# Patient Record
Sex: MALE | Race: WHITE | NOT HISPANIC OR LATINO | Employment: FULL TIME | URBAN - METROPOLITAN AREA
[De-identification: names, ages, dates, MRNs, and addresses within clinical notes are randomized per-mention and may not be internally consistent; named-entity substitution may affect disease eponyms.]

---

## 2021-02-23 ENCOUNTER — OFFICE VISIT (OUTPATIENT)
Dept: INTERNAL MEDICINE | Facility: CLINIC | Age: 41
End: 2021-02-23

## 2021-02-23 VITALS
DIASTOLIC BLOOD PRESSURE: 72 MMHG | HEIGHT: 75 IN | HEART RATE: 63 BPM | SYSTOLIC BLOOD PRESSURE: 114 MMHG | TEMPERATURE: 97.3 F | WEIGHT: 245.3 LBS | BODY MASS INDEX: 30.5 KG/M2 | OXYGEN SATURATION: 99 %

## 2021-02-23 DIAGNOSIS — H93.11 RIGHT-SIDED TINNITUS: Primary | ICD-10-CM

## 2021-02-23 DIAGNOSIS — K21.00 GASTROESOPHAGEAL REFLUX DISEASE WITH ESOPHAGITIS WITHOUT HEMORRHAGE: ICD-10-CM

## 2021-02-23 PROBLEM — E78.5 HYPERLIPIDEMIA: Status: ACTIVE | Noted: 2021-02-23

## 2021-02-23 PROCEDURE — 99204 OFFICE O/P NEW MOD 45 MIN: CPT | Performed by: FAMILY MEDICINE

## 2021-02-23 RX ORDER — PANTOPRAZOLE SODIUM 40 MG/1
40 TABLET, DELAYED RELEASE ORAL DAILY
Qty: 60 TABLET | Refills: 1 | Status: SHIPPED | OUTPATIENT
Start: 2021-02-23 | End: 2021-06-01

## 2021-02-23 NOTE — PROGRESS NOTES
"Date of Encounter: 2021  Patient: Chi Simpson,  1980    Subjective   History of Presenting Illness  Chief complaint: Tinnitus    3 to 4-month history of constant tinnitus in his right ear.  Pulsatile at night but not during the day.  Not associated with any hearing loss.  No other associated symptoms including headache, nasal congestion, or ear pain.  History of multiple myringotomies, ear infections, and eardrum perforations in child and young adulthood.  No known recent drug exposure in particular aminoglycosides.  He does work in law enforcement and operates firearms regularly, although he wears appropriate protection.  He also had frequent exposure to  aircraft in the past.    Heartburn for the past 6 months, worsening with fatty foods like pizza.  Takes Tums 3-4 times per week. Not associate with emesis.  Not taking any other over-the-counter medications.    Lives with spouse, 2 children.  Prior very brief social smoker.  2-3 alcoholic drinks per week.  Exercises 4-5 times per week.  Diet is \"I work out to eat\".  Works in law enforcement.  Does not have a living will.  Second Covid vaccine today.    Review of Systems:  Negative for fever, congestion, chest pain upon exertion, shortness of breath, vision changes, vomiting, dysuria, lymphadenopathy, muscle weakness, numbness, mood changes, rashes.    The following portions of the patient's history were reviewed and updated as appropriate: allergies, current medications, past family history, past medical history, past social history, past surgical history and problem list.    Patient Active Problem List   Diagnosis   • Gastroesophageal reflux disease with esophagitis without hemorrhage   • Right-sided tinnitus     Past Medical History:   Diagnosis Date   • Torn meniscus      Past Surgical History:   Procedure Laterality Date   • KNEE ARTHROSCOPY Right 2011   • KNEE ARTHROSCOPY Left 1997     Family History   Problem Relation Age of " "Onset   • Leukemia Mother    • Heart disease Father    • Hypertension Father    • Prostate cancer Father    • Rheum arthritis Father    • Depression Father    • Alcohol abuse Father        Current Outpatient Medications:   •  pantoprazole (PROTONIX) 40 MG EC tablet, Take 1 tablet by mouth Daily., Disp: 60 tablet, Rfl: 1  Allergies   Allergen Reactions   • Ceclor [Cefaclor] Hives     Social History     Tobacco Use   • Smoking status: Former Smoker   • Smokeless tobacco: Never Used   Substance Use Topics   • Alcohol use: Yes     Alcohol/week: 2.0 - 3.0 standard drinks     Types: 2 - 3 Cans of beer per week   • Drug use: Not Currently     Types: Marijuana          Objective   Physical Exam  Vitals:    02/23/21 1307   BP: 114/72   Pulse: 63   Temp: 97.3 °F (36.3 °C)   TempSrc: Temporal   SpO2: 99%   Weight: 111 kg (245 lb 4.8 oz)   Height: 190.5 cm (75\")     Body mass index is 30.66 kg/m².    Constitutional: NAD.  Eyes: EOMI. PERRLA. Normal conjunctiva.  Ear, nose, mouth, throat: No tonsillar exudates or erythema.   Normal nasal mucosa. Normal external ear canals.  Sclerotic tympanic membranes bilaterally.  Cardiovascular: RRR. No murmurs. No LE edema b/l. Radial pulses 2+ bilaterally.  Pulmonary: CTA b/l. Good effort.  Integumentary: No rashes or wounds on face or upper extremities.  Lymphatic: No anterior cervical lymphadenopathy.  Endocrine: No thyromegaly or palpable thyroid nodules.  Psychiatric: Normal affect. Normal thought content.  Gastrointestinal: Nondistended. No hepatosplenomegaly. No focal tenderness to palpation. Normal bowel sounds.  Hearing test: Tested from 10 to 40 dB at 1000 Hz, 2000 Hz, 4000 Hz.  Abnormal in the right and left ear at 10 dB, 1000 Hz.  10 dB, 4000 Hz.     Assessment/Plan   Assessment and Plan  Pleasant 40-year-old male with hyperlipidemia who presents with the following:    Diagnoses and all orders for this visit:    1. Right-sided tinnitus (Primary): Pulsatile nature at night is only " somewhat of a red flag as it is normal during the day, and he does have some hearing loss not entirely unexpected considering his occupation.  Due to his history of frequent otitis media, myringotomies, and eardrum perforation fairly evident by sclerosis of his tympanic membranes, I would like him to see ENT and audiology for a more comprehensive evaluation.  Discussed reasons to go to the hospital.  -     Ambulatory Referral to ENT (Otolaryngology)    2. Gastroesophageal reflux disease with esophagitis without hemorrhage: Straightforward GERD, discussed risks and benefits of short-term PPI for gastritis.  Taper off once improved.  -     pantoprazole (PROTONIX) 40 MG EC tablet; Take 1 tablet by mouth Daily.  Dispense: 60 tablet; Refill: 1    Follow-up in 6 months for annual physical    Diego Knowles MD  Family Medicine  O: [phone]  C: [phone]    Disclaimer: Parts of this note were dictated by speech recognition. Minor errors in transcription may be present. Please call if questions.

## 2021-03-24 ENCOUNTER — TRANSCRIBE ORDERS (OUTPATIENT)
Dept: ADMINISTRATIVE | Facility: HOSPITAL | Age: 41
End: 2021-03-24

## 2021-03-24 DIAGNOSIS — H93.13 TINNITUS OF BOTH EARS: Primary | ICD-10-CM

## 2021-04-19 ENCOUNTER — HOSPITAL ENCOUNTER (OUTPATIENT)
Dept: MRI IMAGING | Facility: HOSPITAL | Age: 41
Discharge: HOME | End: 2021-04-19
Admitting: OTOLARYNGOLOGY

## 2021-04-19 DIAGNOSIS — H93.13 TINNITUS OF BOTH EARS: ICD-10-CM

## 2021-04-19 PROCEDURE — 70553 MRI BRAIN STEM W/O & W/DYE: CPT

## 2021-04-19 PROCEDURE — 0 GADOBENATE DIMEGLUMINE 529 MG/ML SOLUTION: Performed by: OTOLARYNGOLOGY

## 2021-04-19 PROCEDURE — 70544 MR ANGIOGRAPHY HEAD W/O DYE: CPT

## 2021-04-19 PROCEDURE — A9577 INJ MULTIHANCE: HCPCS | Performed by: OTOLARYNGOLOGY

## 2021-04-19 RX ADMIN — GADOBENATE DIMEGLUMINE 20 ML: 529 INJECTION, SOLUTION INTRAVENOUS at 10:38

## 2021-05-14 ENCOUNTER — TRANSCRIBE ORDERS (OUTPATIENT)
Dept: ADMINISTRATIVE | Facility: HOSPITAL | Age: 41
End: 2021-05-14

## 2021-05-14 DIAGNOSIS — I82.90 VENOUS THROMBOSIS: ICD-10-CM

## 2021-06-01 DIAGNOSIS — K21.00 GASTROESOPHAGEAL REFLUX DISEASE WITH ESOPHAGITIS WITHOUT HEMORRHAGE: ICD-10-CM

## 2021-06-01 RX ORDER — PANTOPRAZOLE SODIUM 40 MG/1
40 TABLET, DELAYED RELEASE ORAL DAILY
Qty: 90 TABLET | Refills: 1 | Status: SHIPPED | OUTPATIENT
Start: 2021-06-01 | End: 2021-10-14 | Stop reason: SDUPTHER

## 2021-06-10 ENCOUNTER — HOSPITAL ENCOUNTER (OUTPATIENT)
Dept: CT IMAGING | Facility: HOSPITAL | Age: 41
Discharge: HOME | End: 2021-06-10
Admitting: OTOLARYNGOLOGY

## 2021-06-10 DIAGNOSIS — I82.90 VENOUS THROMBOSIS: ICD-10-CM

## 2021-06-10 PROCEDURE — 70496 CT ANGIOGRAPHY HEAD: CPT

## 2021-06-10 PROCEDURE — 0 IOPAMIDOL PER 1 ML: Performed by: OTOLARYNGOLOGY

## 2021-06-10 RX ADMIN — IOPAMIDOL 90 ML: 755 INJECTION, SOLUTION INTRAVENOUS at 14:40

## 2021-07-21 ENCOUNTER — HOSPITAL ENCOUNTER (OUTPATIENT)
Dept: CARDIOLOGY | Facility: HOSPITAL | Age: 41
Discharge: HOME | End: 2021-07-21

## 2021-07-21 ENCOUNTER — TRANSCRIBE ORDERS (OUTPATIENT)
Dept: CARDIOLOGY | Facility: HOSPITAL | Age: 41
End: 2021-07-21

## 2021-07-21 ENCOUNTER — TRANSCRIBE ORDERS (OUTPATIENT)
Dept: ADMINISTRATIVE | Facility: HOSPITAL | Age: 41
End: 2021-07-21

## 2021-07-21 ENCOUNTER — LAB (OUTPATIENT)
Dept: LAB | Facility: HOSPITAL | Age: 41
End: 2021-07-21

## 2021-07-21 DIAGNOSIS — M25.512 LEFT SHOULDER PAIN, UNSPECIFIED CHRONICITY: ICD-10-CM

## 2021-07-21 DIAGNOSIS — M25.512 LEFT SHOULDER PAIN, UNSPECIFIED CHRONICITY: Primary | ICD-10-CM

## 2021-07-21 DIAGNOSIS — Z01.811 PRE-OP CHEST EXAM: Primary | ICD-10-CM

## 2021-07-21 LAB
ALBUMIN SERPL-MCNC: 4.4 G/DL (ref 3.5–5.2)
ALBUMIN/GLOB SERPL: 1.8 G/DL
ALP SERPL-CCNC: 48 U/L (ref 39–117)
ALT SERPL W P-5'-P-CCNC: 24 U/L (ref 1–41)
ANION GAP SERPL CALCULATED.3IONS-SCNC: 7.3 MMOL/L (ref 5–15)
AST SERPL-CCNC: 16 U/L (ref 1–40)
BASOPHILS # BLD AUTO: 0.03 10*3/MM3 (ref 0–0.2)
BASOPHILS NFR BLD AUTO: 0.5 % (ref 0–1.5)
BILIRUB SERPL-MCNC: 0.4 MG/DL (ref 0–1.2)
BUN SERPL-MCNC: 13 MG/DL (ref 6–20)
BUN/CREAT SERPL: 11.6 (ref 7–25)
CALCIUM SPEC-SCNC: 9.3 MG/DL (ref 8.6–10.5)
CHLORIDE SERPL-SCNC: 106 MMOL/L (ref 98–107)
CO2 SERPL-SCNC: 26.7 MMOL/L (ref 22–29)
CREAT SERPL-MCNC: 1.12 MG/DL (ref 0.76–1.27)
DEPRECATED RDW RBC AUTO: 42.8 FL (ref 37–54)
EOSINOPHIL # BLD AUTO: 0.06 10*3/MM3 (ref 0–0.4)
EOSINOPHIL NFR BLD AUTO: 0.9 % (ref 0.3–6.2)
ERYTHROCYTE [DISTWIDTH] IN BLOOD BY AUTOMATED COUNT: 13.2 % (ref 12.3–15.4)
GFR SERPL CREATININE-BSD FRML MDRD: 72 ML/MIN/1.73
GLOBULIN UR ELPH-MCNC: 2.4 GM/DL
GLUCOSE SERPL-MCNC: 85 MG/DL (ref 65–99)
HCT VFR BLD AUTO: 45.6 % (ref 37.5–51)
HGB BLD-MCNC: 15.2 G/DL (ref 13–17.7)
IMM GRANULOCYTES # BLD AUTO: 0.03 10*3/MM3 (ref 0–0.05)
IMM GRANULOCYTES NFR BLD AUTO: 0.5 % (ref 0–0.5)
LYMPHOCYTES # BLD AUTO: 1.46 10*3/MM3 (ref 0.7–3.1)
LYMPHOCYTES NFR BLD AUTO: 22.9 % (ref 19.6–45.3)
MCH RBC QN AUTO: 29.3 PG (ref 26.6–33)
MCHC RBC AUTO-ENTMCNC: 33.3 G/DL (ref 31.5–35.7)
MCV RBC AUTO: 87.9 FL (ref 79–97)
MONOCYTES # BLD AUTO: 0.51 10*3/MM3 (ref 0.1–0.9)
MONOCYTES NFR BLD AUTO: 8 % (ref 5–12)
NEUTROPHILS NFR BLD AUTO: 4.28 10*3/MM3 (ref 1.7–7)
NEUTROPHILS NFR BLD AUTO: 67.2 % (ref 42.7–76)
NRBC BLD AUTO-RTO: 0 /100 WBC (ref 0–0.2)
PLATELET # BLD AUTO: 274 10*3/MM3 (ref 140–450)
PMV BLD AUTO: 10 FL (ref 6–12)
POTASSIUM SERPL-SCNC: 4.2 MMOL/L (ref 3.5–5.2)
PROT SERPL-MCNC: 6.8 G/DL (ref 6–8.5)
QT INTERVAL: 417 MS
RBC # BLD AUTO: 5.19 10*6/MM3 (ref 4.14–5.8)
SODIUM SERPL-SCNC: 140 MMOL/L (ref 136–145)
WBC # BLD AUTO: 6.37 10*3/MM3 (ref 3.4–10.8)

## 2021-07-21 PROCEDURE — 85025 COMPLETE CBC W/AUTO DIFF WBC: CPT

## 2021-07-21 PROCEDURE — 36415 COLL VENOUS BLD VENIPUNCTURE: CPT

## 2021-07-21 PROCEDURE — 93005 ELECTROCARDIOGRAM TRACING: CPT

## 2021-07-21 PROCEDURE — 80053 COMPREHEN METABOLIC PANEL: CPT

## 2021-07-21 PROCEDURE — 93010 ELECTROCARDIOGRAM REPORT: CPT | Performed by: INTERNAL MEDICINE

## 2021-10-14 ENCOUNTER — TELEPHONE (OUTPATIENT)
Dept: INTERNAL MEDICINE | Facility: CLINIC | Age: 41
End: 2021-10-14

## 2021-10-14 DIAGNOSIS — K21.00 GASTROESOPHAGEAL REFLUX DISEASE WITH ESOPHAGITIS WITHOUT HEMORRHAGE: ICD-10-CM

## 2021-10-14 RX ORDER — PANTOPRAZOLE SODIUM 40 MG/1
40 TABLET, DELAYED RELEASE ORAL DAILY
Qty: 90 TABLET | Refills: 1 | Status: SHIPPED | OUTPATIENT
Start: 2021-10-14 | End: 2022-11-28 | Stop reason: SDUPTHER

## 2021-10-14 NOTE — TELEPHONE ENCOUNTER
Caller: [facility] [address] [facility] [address] - [phone]  - [phone] FX    Relationship: Pharmacy      Medication requested (name and dosage):    pantoprazole (PROTONIX) 40 MG EC tablet         Pharmacy where request should be sent: [facility], [place] - [address] [facility] [address] - [phone] [facility] [phone] FX        REQUESTING 90 DAY SUPPLY        Best call back number: [phone]    Does the patient have less than a 3 day supply:  [x] Yes  [] No    Vincent Koenig Rep   10/14/21 14:31 EDT

## 2022-03-08 ENCOUNTER — OFFICE VISIT (OUTPATIENT)
Dept: INTERNAL MEDICINE | Facility: CLINIC | Age: 42
End: 2022-03-08

## 2022-03-08 VITALS
HEART RATE: 82 BPM | WEIGHT: 247.4 LBS | OXYGEN SATURATION: 98 % | BODY MASS INDEX: 30.76 KG/M2 | HEIGHT: 75 IN | TEMPERATURE: 97.8 F | SYSTOLIC BLOOD PRESSURE: 132 MMHG | DIASTOLIC BLOOD PRESSURE: 71 MMHG

## 2022-03-08 DIAGNOSIS — E78.5 HYPERLIPIDEMIA, UNSPECIFIED HYPERLIPIDEMIA TYPE: ICD-10-CM

## 2022-03-08 DIAGNOSIS — R53.83 FATIGUE, UNSPECIFIED TYPE: Primary | ICD-10-CM

## 2022-03-08 DIAGNOSIS — J34.2 DEVIATED SEPTUM: ICD-10-CM

## 2022-03-08 DIAGNOSIS — R06.83 SNORING: ICD-10-CM

## 2022-03-08 PROBLEM — M20.012 MALLET FINGER, LEFT: Status: ACTIVE | Noted: 2019-09-11

## 2022-03-08 PROCEDURE — 99214 OFFICE O/P EST MOD 30 MIN: CPT | Performed by: FAMILY MEDICINE

## 2022-03-08 NOTE — PROGRESS NOTES
Date of Encounter: 2022  Patient: Chi Simpson,  1980    Subjective   History of Presenting Illness  Chief complaint: Insomnia    About 2 weeks ago patient began having increased fatigue and feeling of inadequate sleep.  He usually gets about 6 hours per night and despite this he is still finding himself tired throughout the day.  He does snore but there are no witnessed episodes of apnea.  He cannot think of any medication or supplement changes, dietary changes, or recent illnesses.  He is not doing any travel or swing shifting.  He does not take any medications before bedtime and keeps his alcohol use minimal.  He denies any bowel or bladder habit changes, skin changes, mood changes.    Review of Systems:  Negative for fever, cough, shortness of breath, chest pain, abdominal pain, nausea, vomiting, diarrhea, dysuria, skin changes    The following portions of the patient's history were reviewed and updated as appropriate: allergies, current medications, past family history, past medical history, past social history, past surgical history and problem list.    Patient Active Problem List   Diagnosis   • Gastroesophageal reflux disease with esophagitis without hemorrhage   • Right-sided tinnitus   • Hyperlipidemia   • Mallet finger, left   • Deviated septum     Past Medical History:   Diagnosis Date   • Torn meniscus      Past Surgical History:   Procedure Laterality Date   • KNEE ARTHROSCOPY Right 2011   • KNEE ARTHROSCOPY Left 1997     Family History   Problem Relation Age of Onset   • Leukemia Mother    • Heart disease Father    • Hypertension Father    • Prostate cancer Father    • Rheum arthritis Father    • Depression Father    • Alcohol abuse Father        Current Outpatient Medications:   •  pantoprazole (PROTONIX) 40 MG EC tablet, Take 1 tablet by mouth Daily. Taper off of medication slowly once improving. (Patient taking differently: Take 40 mg by mouth Daily. Taper off of medication  "slowly once improving- PRN), Disp: 90 tablet, Rfl: 1  Allergies   Allergen Reactions   • Ceclor [Cefaclor] Hives     Social History     Tobacco Use   • Smoking status: Former Smoker   • Smokeless tobacco: Never Used   Substance Use Topics   • Alcohol use: Yes     Alcohol/week: 2.0 - 3.0 standard drinks     Types: 2 - 3 Cans of beer per week   • Drug use: Not Currently     Types: Marijuana          Objective   Physical Exam  Vitals:    03/08/22 0809   BP: 132/71   Pulse: 82   Temp: 97.8 °F (36.6 °C)   TempSrc: Temporal   SpO2: 98%   Weight: 112 kg (247 lb 6.4 oz)   Height: 190.5 cm (75\")     Body mass index is 30.92 kg/m².    Constitutional: NAD.  Eyes: EOMI. PERRLA. Normal conjunctiva.  Ear, nose, mouth, throat: Mallampati 2.  No tonsillar exudates or erythema.  Mildly deviated nasal septum to the left with no obstruction.  Normal external ear canals and TMs bilaterally.  Cardiovascular: RRR. No murmurs. No LE edema b/l. Radial pulses 2+ bilaterally.  Pulmonary: CTA b/l. Good effort.  Integumentary: No rashes or wounds on face or upper extremities.  Lymphatic: No anterior cervical lymphadenopathy.  Endocrine: No thyromegaly or palpable thyroid nodules.  Psychiatric: Blunted affect. Normal thought content.     Assessment/Plan   Assessment and Plan  Pleasant 41-year-old male with hyperlipidemia, tinnitus, GERD, who presents with the following:    Diagnoses and all orders for this visit:    1. Fatigue, unspecified type (Primary)  -     CBC & Differential  -     Comprehensive Metabolic Panel  -     Thyroid Panel With TSH  -     Vitamin B12  -     Folate  -     Ambulatory Referral to Sleep Medicine    2. Deviated septum    3. Snoring  -     CBC & Differential  -     Comprehensive Metabolic Panel  -     Thyroid Panel With TSH  -     Vitamin B12  -     Folate  -     Ambulatory Referral to Sleep Medicine    4. Hyperlipidemia, unspecified hyperlipidemia type    Fairly acute onset of fatigue, I would like to check blood work " as above to look for alternative causes.  He does have a few risk factors for sleep apnea including deviated nasal septum, snoring, weight, and Mallampati score between 2 and 3, so I will refer him for a sleep study as a hedge in case we cannot find any other underlying cause.    Diego Knowles MD  Family Medicine  O: [phone]  C: [phone]    Disclaimer: Parts of this note were dictated by speech recognition. Minor errors in transcription may be present. Please call if questions.

## 2022-03-09 LAB
ALBUMIN SERPL-MCNC: 4.4 G/DL (ref 4–5)
ALBUMIN/GLOB SERPL: 2 {RATIO} (ref 1.2–2.2)
ALP SERPL-CCNC: 72 IU/L (ref 44–121)
ALT SERPL-CCNC: 24 IU/L (ref 0–44)
AST SERPL-CCNC: 16 IU/L (ref 0–40)
BASOPHILS # BLD AUTO: 0 X10E3/UL (ref 0–0.2)
BASOPHILS NFR BLD AUTO: 1 %
BILIRUB SERPL-MCNC: 0.3 MG/DL (ref 0–1.2)
BUN SERPL-MCNC: 22 MG/DL (ref 6–24)
BUN/CREAT SERPL: 22 (ref 9–20)
CALCIUM SERPL-MCNC: 9.5 MG/DL (ref 8.7–10.2)
CHLORIDE SERPL-SCNC: 104 MMOL/L (ref 96–106)
CO2 SERPL-SCNC: 24 MMOL/L (ref 20–29)
CREAT SERPL-MCNC: 0.98 MG/DL (ref 0.76–1.27)
EGFR GENE MUT ANL BLD/T: 98 ML/MIN/1.73
EOSINOPHIL # BLD AUTO: 0.1 X10E3/UL (ref 0–0.4)
EOSINOPHIL NFR BLD AUTO: 2 %
ERYTHROCYTE [DISTWIDTH] IN BLOOD BY AUTOMATED COUNT: 12.5 % (ref 11.6–15.4)
FOLATE SERPL-MCNC: >20 NG/ML
FT4I SERPL CALC-MCNC: 2.3 (ref 1.2–4.9)
GLOBULIN SER CALC-MCNC: 2.2 G/DL (ref 1.5–4.5)
GLUCOSE SERPL-MCNC: 70 MG/DL (ref 65–99)
HCT VFR BLD AUTO: 45.1 % (ref 37.5–51)
HGB BLD-MCNC: 15.1 G/DL (ref 13–17.7)
IMM GRANULOCYTES # BLD AUTO: 0 X10E3/UL (ref 0–0.1)
IMM GRANULOCYTES NFR BLD AUTO: 0 %
LYMPHOCYTES # BLD AUTO: 1.2 X10E3/UL (ref 0.7–3.1)
LYMPHOCYTES NFR BLD AUTO: 24 %
MCH RBC QN AUTO: 28.7 PG (ref 26.6–33)
MCHC RBC AUTO-ENTMCNC: 33.5 G/DL (ref 31.5–35.7)
MCV RBC AUTO: 86 FL (ref 79–97)
MONOCYTES # BLD AUTO: 0.3 X10E3/UL (ref 0.1–0.9)
MONOCYTES NFR BLD AUTO: 7 %
NEUTROPHILS # BLD AUTO: 3.4 X10E3/UL (ref 1.4–7)
NEUTROPHILS NFR BLD AUTO: 66 %
PLATELET # BLD AUTO: 301 X10E3/UL (ref 150–450)
POTASSIUM SERPL-SCNC: 4.4 MMOL/L (ref 3.5–5.2)
PROT SERPL-MCNC: 6.6 G/DL (ref 6–8.5)
RBC # BLD AUTO: 5.26 X10E6/UL (ref 4.14–5.8)
SODIUM SERPL-SCNC: 142 MMOL/L (ref 134–144)
T3RU NFR SERPL: 24 % (ref 24–39)
T4 SERPL-MCNC: 9.6 UG/DL (ref 4.5–12)
TSH SERPL DL<=0.005 MIU/L-ACNC: 1.19 UIU/ML (ref 0.45–4.5)
VIT B12 SERPL-MCNC: 381 PG/ML (ref 232–1245)
WBC # BLD AUTO: 5.1 X10E3/UL (ref 3.4–10.8)

## 2022-03-09 NOTE — PROGRESS NOTES
Your blood work did not show any obvious underlying causes of fatigue.  I agree with proceeding with a sleep study, they should contact you for an appointment soon.

## 2022-05-03 ENCOUNTER — OFFICE VISIT (OUTPATIENT)
Dept: SLEEP MEDICINE | Facility: HOSPITAL | Age: 42
End: 2022-05-03

## 2022-05-03 VITALS
HEIGHT: 75 IN | WEIGHT: 245 LBS | HEART RATE: 57 BPM | DIASTOLIC BLOOD PRESSURE: 61 MMHG | SYSTOLIC BLOOD PRESSURE: 120 MMHG | BODY MASS INDEX: 30.46 KG/M2 | OXYGEN SATURATION: 97 %

## 2022-05-03 DIAGNOSIS — R53.82 CHRONIC FATIGUE: ICD-10-CM

## 2022-05-03 DIAGNOSIS — G47.33 OSA (OBSTRUCTIVE SLEEP APNEA): Primary | ICD-10-CM

## 2022-05-03 DIAGNOSIS — G47.10 HYPERSOMNIA: ICD-10-CM

## 2022-05-03 DIAGNOSIS — R06.83 SNORING: ICD-10-CM

## 2022-05-03 PROCEDURE — G0463 HOSPITAL OUTPT CLINIC VISIT: HCPCS

## 2022-05-03 NOTE — PROGRESS NOTES
Sleep Medicine initial Consultation    Chi Simpson  : 1980  41 y.o. male   Date of Service: 5/3/2022  Referring provider: Diego Knowles MD    History Of Present Illness:   Mr. Chi Simpson  is a 41 y.o. right handed  male is seen in the sleep clinic for Snoring, Excessive Daytime Sleepiness, Sleep Apnea, Chronic Fatigue and Disturbed Sleep.     The patient c/o excessive daytime sleepiness,  and chronic fatigue.  There is no history of hypnagogic hallucinations, sleep paralysis or cataplexy.    The patient complains of snoring loud in all sleeping positions, awakened gasping for breath, wakened coughing, choking, or respiratory discomfort, has dry mouth or sore mouth when he wakes up and excessive sweating during sleep.    The patient complains of bruxism during sleep and awakened with gastric discomfort or burning sensation in the chest or sour taste.     The patient complains of difficulty staying asleep, frequent awakenings to use the bathroom about twice during the night, has restless sleep, Does not feel rested even after a long sleep and Still feels sleepy even when increasing sleep time.    The patient reports no history of sleepwalking, bedwetting at night, frequent nightmares, wake up screaming at night and acting out dreams.    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    Sleep schedule: While Working: Bed time: 11 p.m. to 12 midnight and wake up time: 7 AM during weekdays: sleep Latency : 20-30 minutes and Total Sleep Time: 7 hours, 30 minutes.     Naps: occasionally for 30 minutes.    Sleep schedule: While NOT Working: Bed time: 12 midnight and wake up time: 9-10 am: sleep Latency : 30 minutes and Total Sleep Time: 8 hours, 30 minutes.     Caffeine intake: 2 cups of coffee in the morning.  [place] Sleepiness Scale: .    PMH, PSH, Medications, allergies, FH, SH are reviewed and updated in the chart.     Review of Systems   Constitutional: Positive for fatigue.   HENT: Positive for drooling,  "tinnitus and voice change.    Gastrointestinal: Positive for diarrhea.   Allergic/Immunologic: Positive for environmental allergies.   All other systems reviewed and are negative.    Patient examination:  Vitals:    05/03/22 0914   BP: 120/61   Pulse: 57   SpO2: 97%   Weight: 111 kg (245 lb)   Height: 190.5 cm (75\")    Body mass index is 30.62 kg/m².  Neck Circumference: 15.5 inches   Well-developed, well-nourished in no apparent distress.  HEENT: Normal.   Neck: Supple no carotid bruits.  Lungs: Clear to auscultation.  Cardiac exam: Normal and regular rate and rhythm. No murmurs.  Extremities: No edema clubbing or cyanosis.  Diagnostic data reviewed:  MRI brain: 4/19/2021: 1. Normal MRI of the brain and internal auditory canals. 2. Incidental note is made of a branch of the right anterior-inferior cerebellar artery that extends through the porus acusticus into the central portion of the right internal auditory canal where it abuts the right VII/VIII cranial nerve complex within the right internal auditory canal which is a normal variant in the course of this vessel.  MRA brain: 4/19/2021: 1. There is a small nodular or conical 2 mm protuberance off the posterior wall of the supracavernous internal carotid artery, probably an infundibulum at hypoplastic PCOM origin. Overall the MRA of the head is within normal limits. Incidental note is made of a branch of the right anterior-inferior cerebellar artery that swings through the porous  acusticus into the right internal auditory canal that is a normal variation in the course of this vessel.   CT venogram of the brain: 6/11/2021: No evidence of dural sinus thrombosis.  Vitamin B12: 3/8/2022: 381 pg/mL  TSH: 3/8/2022: 1.190 µIU/mL.    ASSESSMENT AND PLAN:The patient has symptoms of obstructive sleep apnea syndrome with hypersomnia. We will proceed with polysomnography and treat with CPAP therapy if needed. Sleep hygiene techniques discussed.  Exercise diet and weight loss " discussed.    Encounter Diagnoses   Name Primary?   • SEE (obstructive sleep apnea) Yes   • Hypersomnia    • Snoring    • Chronic fatigue      Orders Placed This Encounter   Procedures   • Vitamin D 25 Hydroxy   • CK   • Testosterone (Free & Total), LC / MS   • Iron and TIBC   • Vitamin B12 and Folate   • Home Sleep Study     Return in about 3 months (around 8/3/2022).    Nery Bray MD  5/3/2022  09:51 EDT

## 2022-06-02 ENCOUNTER — HOSPITAL ENCOUNTER (OUTPATIENT)
Dept: SLEEP MEDICINE | Facility: HOSPITAL | Age: 42
Discharge: HOME | End: 2022-06-02
Admitting: PSYCHIATRY & NEUROLOGY

## 2022-06-02 DIAGNOSIS — R06.83 SNORING: ICD-10-CM

## 2022-06-02 DIAGNOSIS — G47.33 OSA (OBSTRUCTIVE SLEEP APNEA): ICD-10-CM

## 2022-06-02 DIAGNOSIS — G47.10 HYPERSOMNIA: ICD-10-CM

## 2022-06-02 DIAGNOSIS — R53.82 CHRONIC FATIGUE: ICD-10-CM

## 2022-06-02 PROCEDURE — 95806 SLEEP STUDY UNATT&RESP EFFT: CPT

## 2022-06-29 ENCOUNTER — TELEPHONE (OUTPATIENT)
Dept: SLEEP MEDICINE | Facility: HOSPITAL | Age: 42
End: 2022-06-29

## 2022-06-29 NOTE — TELEPHONE ENCOUNTER
Spoke with patient about sleep study results positional sleep apnea . Follow up as needed , refrain from sleeping on back

## 2022-09-09 ENCOUNTER — OFFICE VISIT (OUTPATIENT)
Dept: INTERNAL MEDICINE | Facility: CLINIC | Age: 42
End: 2022-09-09

## 2022-09-09 VITALS
TEMPERATURE: 97.8 F | HEART RATE: 55 BPM | OXYGEN SATURATION: 98 % | WEIGHT: 249.2 LBS | DIASTOLIC BLOOD PRESSURE: 82 MMHG | BODY MASS INDEX: 30.99 KG/M2 | SYSTOLIC BLOOD PRESSURE: 115 MMHG | HEIGHT: 75 IN

## 2022-09-09 DIAGNOSIS — R19.02 ABDOMINAL MASS, LUQ (LEFT UPPER QUADRANT): Primary | ICD-10-CM

## 2022-09-09 PROCEDURE — 99213 OFFICE O/P EST LOW 20 MIN: CPT | Performed by: NURSE PRACTITIONER

## 2022-09-09 NOTE — PROGRESS NOTES
Date of Encounter: 2022  Patient: Chi Simpson,  1980    Subjective     Chief complaint: Abdominal mass    HPI   Patient here today to discuss a lump that he is felt about 1 month ago.  Patient states that it has not changed in size since he noticed it.  Patient denies any discomfort.  States that he does notice it more when he coughs.  Denies any pain or injury.    Review of Systems:  Negative for fever, congestion, chest pain upon exertion, shortness of breath, vision changes, vomiting, dysuria, lymphadenopathy, muscle weakness, numbness, mood changes, rashes.    The following portions of the patient's history were reviewed and updated as appropriate: allergies, current medications, past family history, past medical history, past social history, past surgical history and problem list.    Patient Active Problem List   Diagnosis   • Gastroesophageal reflux disease with esophagitis without hemorrhage   • Right-sided tinnitus   • Hyperlipidemia   • Mallet finger, left   • Deviated septum     Past Medical History:   Diagnosis Date   • Torn meniscus      Past Surgical History:   Procedure Laterality Date   • KNEE ARTHROSCOPY Right 2011   • KNEE ARTHROSCOPY Left 1997     Family History   Problem Relation Age of Onset   • Leukemia Mother    • Heart disease Father    • Hypertension Father    • Prostate cancer Father    • Rheum arthritis Father    • Depression Father    • Alcohol abuse Father        Current Outpatient Medications:   •  pantoprazole (PROTONIX) 40 MG EC tablet, Take 1 tablet by mouth Daily. Taper off of medication slowly once improving. (Patient taking differently: Take 40 mg by mouth Daily. Taper off of medication slowly once improving- PRN), Disp: 90 tablet, Rfl: 1  Allergies   Allergen Reactions   • Ceclor [Cefaclor] Hives     Social History     Tobacco Use   • Smoking status: Former Smoker   • Smokeless tobacco: Never Used   Substance Use Topics   • Alcohol use: Yes     Alcohol/week:  "2.0 - 3.0 standard drinks     Types: 2 - 3 Cans of beer per week   • Drug use: Not Currently     Types: Marijuana          Objective   Physical Exam   Vitals:    09/09/22 1117   BP: 115/82   Pulse: 55   Temp: 97.8 °F (36.6 °C)   TempSrc: Temporal   SpO2: 98%   Weight: 113 kg (249 lb 3.2 oz)   Height: 190.5 cm (75\")     Body mass index is 31.15 kg/m².    Constitutional: NAD.  Abdominal: Abdomen soft and flat.  Negative CVA tenderness.  Negative rebound tenderness.  Patient has small quarter size mass below left rib cage, mid clavicular line.  Soft and movable.  No signs of infection.  No pain with palpation.         Assessment & Plan   Assessment and Plan  Pleasant 42-year-old male with hyperlipidemia, right-sided tinnitus and others here today to discuss the following:    Diagnoses and all orders for this visit:    1. Abdominal mass, LUQ (left upper quadrant) (Primary)  -     CT abdomen w contrast; Future         Discussed about hernia versus lipoma versus other.  Discussed about neck steps in care and answered some questions from the patient.  Patient verbalized understanding of and agreed to plan of care.    Return if symptoms worsen or fail to improve.     Griselda Harper DNP, FNP-C  Family Medicine  O: [phone]      Please note that portions of this note were completed with a voice recognition program. Please call if questions.     "

## 2022-10-05 ENCOUNTER — HOSPITAL ENCOUNTER (OUTPATIENT)
Dept: CT IMAGING | Facility: HOSPITAL | Age: 42
Discharge: HOME | End: 2022-10-05
Admitting: NURSE PRACTITIONER

## 2022-10-05 DIAGNOSIS — R19.02 ABDOMINAL MASS, LUQ (LEFT UPPER QUADRANT): ICD-10-CM

## 2022-10-05 PROCEDURE — 25010000002 IOPAMIDOL 61 % SOLUTION: Performed by: NURSE PRACTITIONER

## 2022-10-05 PROCEDURE — 74160 CT ABDOMEN W/CONTRAST: CPT

## 2022-10-05 RX ADMIN — IOPAMIDOL 85 ML: 612 INJECTION, SOLUTION INTRAVENOUS at 08:20

## 2022-10-07 ENCOUNTER — TELEPHONE (OUTPATIENT)
Dept: INTERNAL MEDICINE | Facility: CLINIC | Age: 42
End: 2022-10-07

## 2022-10-07 DIAGNOSIS — N28.89 RIGHT RENAL MASS: Primary | ICD-10-CM

## 2022-10-07 DIAGNOSIS — N28.89 RENAL MASS, RIGHT: Primary | ICD-10-CM

## 2022-10-07 NOTE — TELEPHONE ENCOUNTER
Reviewed CT results with patient via phone call.  Answered questions from the patient regarding results.  Discussed about next steps in care.  Patient awaiting scheduling for MRI and urology consultation.  We will update patient's PCP on plan of care.

## 2022-10-26 ENCOUNTER — HOSPITAL ENCOUNTER (OUTPATIENT)
Dept: MRI IMAGING | Facility: HOSPITAL | Age: 42
Discharge: HOME | End: 2022-10-26
Admitting: FAMILY MEDICINE

## 2022-10-26 DIAGNOSIS — N28.89 RENAL MASS, RIGHT: ICD-10-CM

## 2022-10-26 PROCEDURE — A9577 INJ MULTIHANCE: HCPCS | Performed by: FAMILY MEDICINE

## 2022-10-26 PROCEDURE — 0 GADOBENATE DIMEGLUMINE 529 MG/ML SOLUTION: Performed by: FAMILY MEDICINE

## 2022-10-26 PROCEDURE — 74183 MRI ABD W/O CNTR FLWD CNTR: CPT

## 2022-10-26 RX ADMIN — GADOBENATE DIMEGLUMINE 20 ML: 529 INJECTION, SOLUTION INTRAVENOUS at 15:52

## 2022-10-27 ENCOUNTER — TRANSCRIBE ORDERS (OUTPATIENT)
Dept: GENERAL RADIOLOGY | Facility: HOSPITAL | Age: 42
End: 2022-10-27

## 2022-10-27 DIAGNOSIS — N28.89 RIGHT RENAL MASS: Primary | ICD-10-CM

## 2022-10-29 NOTE — PROGRESS NOTES
Discussed with patient, he saw urology today, considering biopsy first over partial nephrectomy  Currently scheduling this through [facility] wondering if he should schedule earlier through [facility]  He does not have risk factors for renal cell carcinoma this may have been occupational exposure  I told him my preference would be immediate removal over biopsy as biopsy is unlikely to change surgical recommendation  I am okay with him going with [facility] or whoever can get him in the earliest, recommend he talk to urology office on Monday  If he does have to see an oncologist I prefer [facility]

## 2022-11-02 NOTE — PROGRESS NOTES
11/14/22 0001   Pre-Procedure Phone Call   Procedure Time Verified Yes   Arrival Time 0630   Procedure Location Verified Yes   Medical History Reviewed No   NPO Status Reinforced Yes   Ride and Caregiver Arranged Yes   Phone Number for Ride/Caregiver pt states he is going to cancel the procedure. Pt given the number to scheduling to call [facility] or [facility] to cancel his procedure.   Patient Knows to Bring Current Medications No   Bring Outside Films Requested No

## 2022-11-14 ENCOUNTER — HOSPITAL ENCOUNTER (OUTPATIENT)
Dept: CT IMAGING | Facility: HOSPITAL | Age: 42
Discharge: HOME | End: 2022-11-14

## 2022-11-28 DIAGNOSIS — K21.00 GASTROESOPHAGEAL REFLUX DISEASE WITH ESOPHAGITIS WITHOUT HEMORRHAGE: ICD-10-CM

## 2022-11-29 RX ORDER — PANTOPRAZOLE SODIUM 40 MG/1
40 TABLET, DELAYED RELEASE ORAL DAILY
Qty: 90 TABLET | Refills: 1 | Status: SHIPPED | OUTPATIENT
Start: 2022-11-29

## 2023-07-31 ENCOUNTER — TELEMEDICINE (OUTPATIENT)
Dept: ONCOLOGY | Facility: CLINIC | Age: 43
End: 2023-07-31
Payer: COMMERCIAL

## 2023-07-31 DIAGNOSIS — C64.1 CANCER OF KIDNEY PARENCHYMA, RIGHT: Primary | ICD-10-CM

## 2023-08-11 ENCOUNTER — OFFICE VISIT (OUTPATIENT)
Dept: INTERNAL MEDICINE | Facility: CLINIC | Age: 43
End: 2023-08-11
Payer: COMMERCIAL

## 2023-08-11 VITALS
DIASTOLIC BLOOD PRESSURE: 66 MMHG | OXYGEN SATURATION: 97 % | HEART RATE: 82 BPM | SYSTOLIC BLOOD PRESSURE: 120 MMHG | TEMPERATURE: 97.1 F

## 2023-08-11 DIAGNOSIS — U07.1 COVID-19 VIRUS INFECTION: Primary | ICD-10-CM

## 2023-08-11 LAB
EXPIRATION DATE: ABNORMAL
EXPIRATION DATE: NORMAL
FLUAV AG UPPER RESP QL IA.RAPID: NOT DETECTED
FLUBV AG UPPER RESP QL IA.RAPID: NOT DETECTED
INTERNAL CONTROL: ABNORMAL
INTERNAL CONTROL: NORMAL
Lab: ABNORMAL
Lab: NORMAL
S PYO AG THROAT QL: NEGATIVE
SARS-COV-2 AG UPPER RESP QL IA.RAPID: DETECTED

## 2023-08-11 PROCEDURE — 99214 OFFICE O/P EST MOD 30 MIN: CPT | Performed by: FAMILY MEDICINE

## 2023-08-11 PROCEDURE — 87428 SARSCOV & INF VIR A&B AG IA: CPT | Performed by: FAMILY MEDICINE

## 2023-08-11 NOTE — PROGRESS NOTES
Date of Encounter: 2023  Patient: Chi Simpson,  1980    Subjective   History of Presenting Illness  Chief complaint: Sore throat    3 to 4 days of sore throat, headache, myalgias, fever, chills, diarrhea, mild cough. No shortness of breath or chest pain. Recently returned from trip to [place].  Wife has headache and sore throat at home.  Point-of-care testing strongly positive for COVID-19.    The following portions of the patient's history were reviewed and updated as appropriate: allergies, current medications, past family history, past medical history, past social history, past surgical history and problem list.    Patient Active Problem List   Diagnosis    Gastroesophageal reflux disease with esophagitis without hemorrhage    Right-sided tinnitus    Hyperlipidemia    Mallet finger, left    Deviated septum     Past Medical History:   Diagnosis Date    Allergic     Pet dander; ceclore    Cancer     Kidney cancer on the right kidney; removal of two basal cell carcinomas    GERD (gastroesophageal reflux disease)     Torn meniscus      Past Surgical History:   Procedure Laterality Date    KNEE ARTHROSCOPY Right 2011    KNEE ARTHROSCOPY Left 1997     Family History   Problem Relation Age of Onset    Leukemia Mother     Heart disease Father     Hypertension Father     Prostate cancer Father     Rheum arthritis Father     Depression Father     Alcohol abuse Father     Cancer Father         Prostate cancer; bone cancer       Current Outpatient Medications:     acetaminophen (TYLENOL) 325 MG tablet, Take 1,000 mg by mouth., Disp: , Rfl:     pantoprazole (PROTONIX) 40 MG EC tablet, Take 1 tablet by mouth Daily. Taper off of medication slowly once improving., Disp: 90 tablet, Rfl: 3    Nirmatrelvir&Ritonavir 300/100 (PAXLOVID) 20 x 150 MG & 10 x 100MG tablet therapy pack tablet, Take 3 tablets by mouth 2 (Two) Times a Day for 5 days., Disp: 30 tablet, Rfl: 0  Allergies   Allergen Reactions    [facility]  [Cefaclor] Hives     Social History     Tobacco Use    Smoking status: Former    Smokeless tobacco: Never   Substance Use Topics    Alcohol use: Yes     Alcohol/week: 2.0 - 3.0 standard drinks     Types: 2 - 3 Cans of beer per week    Drug use: Not Currently     Types: Marijuana          Objective   Physical Exam  Vitals:    08/11/23 1424   BP: 120/66   Pulse: 82   Temp: 97.1 øF (36.2 øC)   TempSrc: Infrared   SpO2: 97%     There is no height or weight on file to calculate BMI.    Constitutional: Diaphoretic, warm to touch  Eyes: EOMI. PERRLA. Normal conjunctiva.  Ear, nose, mouth, throat: No tonsillar exudates or erythema.   Normal nasal mucosa. Normal external ear canals and TMs bilaterally.  Cardiovascular: RRR. No murmurs. No LE edema b/l. Radial pulses 2+ bilaterally.  Pulmonary: CTA b/l. Good effort.  Integumentary: No rashes or wounds on face or upper extremities.  Lymphatic: No anterior cervical lymphadenopathy.  Endocrine: No thyromegaly or palpable thyroid nodules.  Psychiatric: Normal affect. Normal thought content.     Assessment & Plan   Assessment and Plan  Pleasant 42-year-old male with hyperlipidemia, GERD, who presents with the following:    Diagnoses and all orders for this visit:    1. COVID-19 virus infection (Primary)  -     Nirmatrelvir&Ritonavir 300/100 (PAXLOVID) 20 x 150 MG & 10 x 100MG tablet therapy pack tablet; Take 3 tablets by mouth 2 (Two) Times a Day for 5 days.  Dispense: 30 tablet; Refill: 0    Based on his history and symptoms I recommend Paxlovid.  Discussed risks and benefits of this medication and reasons to consider follow-up.  We will also send in a prescription for his wife who I also see.  Do not recommend their 22-year-old son use Paxlovid at this time.    Diego Knowles MD  Family Medicine  O: [phone]    Disclaimer: Parts of this note were dictated by speech recognition. Minor errors in transcription may be present. Please call if questions.

## 2023-08-24 ENCOUNTER — OFFICE VISIT (OUTPATIENT)
Dept: INTERNAL MEDICINE | Facility: CLINIC | Age: 43
End: 2023-08-24
Payer: COMMERCIAL

## 2023-08-24 VITALS
TEMPERATURE: 97.3 F | HEART RATE: 76 BPM | WEIGHT: 258.3 LBS | OXYGEN SATURATION: 99 % | BODY MASS INDEX: 32.12 KG/M2 | DIASTOLIC BLOOD PRESSURE: 68 MMHG | HEIGHT: 75 IN | SYSTOLIC BLOOD PRESSURE: 120 MMHG

## 2023-08-24 DIAGNOSIS — R05.9 COUGH, UNSPECIFIED TYPE: ICD-10-CM

## 2023-08-24 DIAGNOSIS — J02.0 PHARYNGITIS DUE TO STREPTOCOCCUS SPECIES: Primary | ICD-10-CM

## 2023-08-24 PROCEDURE — 99214 OFFICE O/P EST MOD 30 MIN: CPT | Performed by: FAMILY MEDICINE

## 2023-08-24 RX ORDER — AMOXICILLIN AND CLAVULANATE POTASSIUM 875; 125 MG/1; MG/1
1 TABLET, FILM COATED ORAL 2 TIMES DAILY
Qty: 14 TABLET | Refills: 0 | Status: SHIPPED | OUTPATIENT
Start: 2023-08-24 | End: 2023-08-31

## 2023-08-24 NOTE — PROGRESS NOTES
Date of Encounter: 2023  Patient: Chi Simpson,  1980    Subjective   History of Presenting Illness  Chief complaint: Cough, hoarseness    About 3-day history of worsening cough with minimal production, hoarseness, facial discomfort, nasal congestion.  Wife has a positive strep test and she is being treated with azithromycin.  Daughter was sick about a week ago but is now feeling fine.  He denies abdominal pain, bowel habit changes, fever or chills, shortness of breath, chest pain. Point-of-care testing positive for strep, negative for COVID-19.    The following portions of the patient's history were reviewed and updated as appropriate: allergies, current medications, past family history, past medical history, past social history, past surgical history and problem list.    Patient Active Problem List   Diagnosis    Gastroesophageal reflux disease with esophagitis without hemorrhage    Right-sided tinnitus    Hyperlipidemia    Mallet finger, left    Deviated septum     Past Medical History:   Diagnosis Date    Allergic     Pet dander; ceclore    Cancer     Kidney cancer on the right kidney; removal of two basal cell carcinomas    GERD (gastroesophageal reflux disease)     Torn meniscus      Past Surgical History:   Procedure Laterality Date    KNEE ARTHROSCOPY Right 2011    KNEE ARTHROSCOPY Left 1997     Family History   Problem Relation Age of Onset    Leukemia Mother     Heart disease Father     Hypertension Father     Prostate cancer Father     Rheum arthritis Father     Depression Father     Alcohol abuse Father     Cancer Father         Prostate cancer; bone cancer       Current Outpatient Medications:     acetaminophen (TYLENOL) 325 MG tablet, Take 1,000 mg by mouth., Disp: , Rfl:     amoxicillin-clavulanate (AUGMENTIN) 875-125 MG per tablet, Take 1 tablet by mouth 2 (Two) Times a Day for 7 days., Disp: 14 tablet, Rfl: 0    pantoprazole (PROTONIX) 40 MG EC tablet, Take 1 tablet by mouth  "Daily. Taper off of medication slowly once improving., Disp: 90 tablet, Rfl: 3  Allergies   Allergen Reactions    Ceclor [Cefaclor] Hives     Social History     Tobacco Use    Smoking status: Never    Smokeless tobacco: Never   Vaping Use    Vaping Use: Never used   Substance Use Topics    Alcohol use: Yes     Alcohol/week: 2.0 - 3.0 standard drinks     Types: 2 - 3 Cans of beer per week    Drug use: Not Currently     Types: Marijuana          Objective   Physical Exam  Vitals:    08/24/23 1019   BP: 120/68   BP Location: Right arm   Patient Position: Sitting   Cuff Size: Large Adult   Pulse: 76   Temp: 97.3 øF (36.3 øC)   TempSrc: Infrared   SpO2: 99%   Weight: 117 kg (258 lb 4.8 oz)   Height: 190.5 cm (75\")     Body mass index is 32.29 kg/mý.    Constitutional: NAD.  Eyes: EOMI. PERRLA. Normal conjunctiva.  Ear, nose, mouth, throat: Mild tonsillar erythema but no exudates.  Postnasal drip.  Mild bilateral maxillary sinus tenderness to palpation. Normal external ear canals and TMs bilaterally.  Cardiovascular: RRR. No murmurs. No LE edema b/l. Radial pulses 2+ bilaterally.  Pulmonary: CTA b/l. Good effort.  Integumentary: No rashes or wounds on face or upper extremities.  Lymphatic: No anterior cervical lymphadenopathy.  Endocrine: No thyromegaly or palpable thyroid nodules.  Psychiatric: Normal affect. Normal thought content.     Assessment & Plan   Assessment and Plan  Pleasant 43-year-old male with hyperlipidemia, GERD, who presents with the following:     Diagnoses and all orders for this visit:    1. Pharyngitis due to Streptococcus species (Primary): Wife is being treated for strep and they have a young kid at home and he is positive, I would opt just to treat this instead of waiting for culture.  That being said his examination is not entirely convincing for strep.  I discussed reasons to follow-up.  -     amoxicillin-clavulanate (AUGMENTIN) 875-125 MG per tablet; Take 1 tablet by mouth 2 (Two) Times a Day " for 7 days.  Dispense: 14 tablet; Refill: 0  2. Cough, unspecified type    Diego Knowles MD  Family Medicine  O: [phone]    Disclaimer: Parts of this note were dictated by speech recognition. Minor errors in transcription may be present. Please call if questions.

## 2023-10-11 ENCOUNTER — OFFICE VISIT (OUTPATIENT)
Dept: INTERNAL MEDICINE | Facility: CLINIC | Age: 43
End: 2023-10-11
Payer: COMMERCIAL

## 2023-10-11 VITALS
OXYGEN SATURATION: 98 % | DIASTOLIC BLOOD PRESSURE: 70 MMHG | BODY MASS INDEX: 32.12 KG/M2 | TEMPERATURE: 98.6 F | WEIGHT: 258.3 LBS | SYSTOLIC BLOOD PRESSURE: 118 MMHG | HEIGHT: 75 IN | HEART RATE: 71 BPM

## 2023-10-11 DIAGNOSIS — J06.9 UPPER RESPIRATORY INFECTION, VIRAL: ICD-10-CM

## 2023-10-11 DIAGNOSIS — R05.9 COUGH, UNSPECIFIED TYPE: Primary | ICD-10-CM

## 2023-10-11 LAB
EXPIRATION DATE: NORMAL
FLUAV AG UPPER RESP QL IA.RAPID: NOT DETECTED
FLUBV AG UPPER RESP QL IA.RAPID: NOT DETECTED
INTERNAL CONTROL: NORMAL
Lab: NORMAL
SARS-COV-2 AG UPPER RESP QL IA.RAPID: NOT DETECTED

## 2023-10-11 PROCEDURE — 99213 OFFICE O/P EST LOW 20 MIN: CPT | Performed by: NURSE PRACTITIONER

## 2023-10-11 PROCEDURE — 87428 SARSCOV & INF VIR A&B AG IA: CPT | Performed by: NURSE PRACTITIONER

## 2023-10-11 RX ORDER — DEXTROMETHORPHAN HYDROBROMIDE AND PROMETHAZINE HYDROCHLORIDE 15; 6.25 MG/5ML; MG/5ML
5 SYRUP ORAL 4 TIMES DAILY PRN
Qty: 120 ML | Refills: 0 | Status: SHIPPED | OUTPATIENT
Start: 2023-10-11 | End: 2023-10-15

## 2023-10-11 NOTE — PROGRESS NOTES
"Chief Complaint  Cough    Subjective        Chi Simpson presents to [facility]  History of Present Illness  Here with 3-4 day history of dry cough with intermittent trace of phlegm.     He has been taking acetaminophen and Maddox's cough drops. Irritated throat. No other symptoms aside from mild nasal congestion a few days ago. No other sick contacts.  No shortness of air, chest pain, increased fatigue, or dizziness.             Objective   Vital Signs:  /70 (BP Location: Right arm, Patient Position: Lying, Cuff Size: Large Adult)   Pulse 71   Temp 98.6 øF (37 øC) (Infrared)   Ht 190.5 cm (75\")   Wt 117 kg (258 lb 4.8 oz)   SpO2 98%   BMI 32.29 kg/mý   Estimated body mass index is 32.29 kg/mý as calculated from the following:    Height as of this encounter: 190.5 cm (75\").    Weight as of this encounter: 117 kg (258 lb 4.8 oz).       BMI is >= 30 and <35. (Class 1 Obesity). The following options were offered after discussion;: not discussed at this office visit.       Physical Exam  Vitals reviewed.   Constitutional:       General: He is not in acute distress.     Appearance: Normal appearance. He is not ill-appearing, toxic-appearing or diaphoretic.   HENT:      Right Ear: Hearing, ear canal and external ear normal. A middle ear effusion (cloudy and white) is present. No mastoid tenderness.      Left Ear: Hearing, ear canal and external ear normal. A middle ear effusion (mild) is present. No mastoid tenderness. Tympanic membrane is scarred.      Nose: Congestion present.   Cardiovascular:      Rate and Rhythm: Normal rate and regular rhythm.      Pulses: Normal pulses.      Heart sounds: Normal heart sounds.   Pulmonary:      Effort: Pulmonary effort is normal.      Breath sounds: Normal breath sounds.   Skin:     General: Skin is warm.   Neurological:      General: No focal deficit present.      Mental Status: He is alert and oriented to person, place, and time. Mental " status is at baseline.   Psychiatric:         Mood and Affect: Mood normal.         Behavior: Behavior normal.         Thought Content: Thought content normal.         Judgment: Judgment normal.        Result Review :                   Assessment and Plan   Patient is a very pleasant 43 year-old male with history of chronic ear infections here with URI most likely viral.     Results for orders placed or performed in visit on 10/11/23   POCT SARS-CoV-2 Antigen ANOOP    Specimen: Swab   Result Value Ref Range    SARS Antigen Not Detected Not Detected, Presumptive Negative    Influenza A Antigen ANOOP Not Detected Not Detected    Influenza B Antigen ANOOP Not Detected Not Detected    Internal Control Passed Passed    Lot Number [id number]     Expiration Date 10/27/2024          Diagnoses and all orders for this visit:    1. Cough, unspecified type (Primary)  -     POCT SARS-CoV-2 Antigen ANOOP  -     promethazine-dextromethorphan (PROMETHAZINE-DM) 6.25-15 MG/5ML syrup; Take 5 mL by mouth 4 (Four) Times a Day As Needed for Cough for up to 4 days.  Dispense: 120 mL; Refill: 0    2. Upper respiratory infection, viral    -Take over-the-counter Flonase and anti-histamine daily, such as loratidine.           Follow Up   Return if symptoms worsen or fail to improve.  Patient was given instructions and counseling regarding his condition or for health maintenance advice. Please see specific information pulled into the AVS if appropriate.

## 2023-10-26 DIAGNOSIS — J18.9 LINGULAR PNEUMONIA: Primary | ICD-10-CM

## 2023-10-27 ENCOUNTER — OFFICE VISIT (OUTPATIENT)
Dept: INTERNAL MEDICINE | Facility: CLINIC | Age: 43
End: 2023-10-27
Payer: COMMERCIAL

## 2023-10-27 VITALS
HEART RATE: 79 BPM | BODY MASS INDEX: 32.12 KG/M2 | OXYGEN SATURATION: 97 % | HEIGHT: 75 IN | DIASTOLIC BLOOD PRESSURE: 68 MMHG | TEMPERATURE: 98.4 F | SYSTOLIC BLOOD PRESSURE: 118 MMHG | WEIGHT: 258.3 LBS

## 2023-10-27 DIAGNOSIS — J18.9 LINGULAR PNEUMONIA: Primary | ICD-10-CM

## 2023-10-27 PROCEDURE — 99213 OFFICE O/P EST LOW 20 MIN: CPT | Performed by: NURSE PRACTITIONER

## 2023-10-27 RX ORDER — AZITHROMYCIN 250 MG/1
TABLET, FILM COATED ORAL
Qty: 6 TABLET | Refills: 0 | Status: SHIPPED | OUTPATIENT
Start: 2023-10-27

## 2023-10-27 RX ORDER — ALBUTEROL SULFATE 90 UG/1
2 AEROSOL, METERED RESPIRATORY (INHALATION) EVERY 4 HOURS PRN
Qty: 8 G | Refills: 0 | Status: SHIPPED | OUTPATIENT
Start: 2023-10-27

## 2023-10-27 RX ORDER — AMOXICILLIN 500 MG/1
1000 CAPSULE ORAL 3 TIMES DAILY
Qty: 30 CAPSULE | Refills: 0 | Status: SHIPPED | OUTPATIENT
Start: 2023-10-27 | End: 2023-11-01

## 2023-10-27 NOTE — PROGRESS NOTES
"Chief Complaint  Cough and Follow-up (Imaging in chart)    Subjective        Chi Simpson presents to [facility]  History of Present Illness  Here for chronic cough and follow-up related to most recent chest x-ray.    He was last seen on October 11, 2023 with report of 3-4 day history of mild cough. Took promethazine-DM, was feeling a little better, but cough worsened about 2 days later. He did not take any cough syrup last night and slept well.     Chest x-ray that was performed on October 25, 2023, sent to Dr. Knowles by urology for history of kidney cancer was positive for new angular lingual pneumonia.  It was recommended that a follow-up chest x-ray be performed in 6 to 12 weeks with possible CT chest.  Dr. Knowles did already put in the chest x-ray for future evaluation.  There was no strong evidence of metastatic disease at the time.    He denies short of air, chest pain, wheezing, extreme fatigue, fever.  He does not appear acutely ill today.      Objective   Vital Signs:  /68 (BP Location: Right arm, Patient Position: Sitting, Cuff Size: Adult)   Pulse 79   Temp 98.4 °F (36.9 °C) (Infrared)   Ht 190.5 cm (75\")   Wt 117 kg (258 lb 4.8 oz)   SpO2 97%   BMI 32.29 kg/m²   Estimated body mass index is 32.29 kg/m² as calculated from the following:    Height as of this encounter: 190.5 cm (75\").    Weight as of this encounter: 117 kg (258 lb 4.8 oz).               Physical Exam  Vitals reviewed.   Constitutional:       General: He is not in acute distress.     Appearance: Normal appearance. He is obese. He is not ill-appearing, toxic-appearing or diaphoretic.   Cardiovascular:      Rate and Rhythm: Normal rate and regular rhythm.      Pulses: Normal pulses.      Heart sounds: Normal heart sounds.   Pulmonary:      Effort: Pulmonary effort is normal.      Breath sounds: Normal air entry. No decreased air movement. Examination of the left-lower field reveals wheezing. " Wheezing (faint, with expiration) present. No rhonchi or rales.   Skin:     General: Skin is warm.      Capillary Refill: Capillary refill takes less than 2 seconds.   Neurological:      General: No focal deficit present.      Mental Status: He is alert and oriented to person, place, and time. Mental status is at baseline.   Psychiatric:         Mood and Affect: Mood normal.         Behavior: Behavior normal.         Thought Content: Thought content normal.         Judgment: Judgment normal.        Result Review :      Data reviewed : Radiologic studies Chest x-ray 10/25/2023             Assessment and Plan   Patient is a very pleasant 43-year-old male with hyperlipidemia, right-sided tinnitus, deviated septum, GERD, history of kidney cancer of the right kidney here with continued cough and treatment for lingular pneumonia found on most recent chest x-ray.    Diagnoses and all orders for this visit:    1. Lingular pneumonia (Primary)  -     amoxicillin (AMOXIL) 500 MG capsule; Take 2 capsules by mouth 3 (Three) Times a Day for 5 days.  Dispense: 30 capsule; Refill: 0  -     azithromycin (Zithromax Z-Alexander) 250 MG tablet; Take 2 tablets by mouth on day 1, then 1 tablet daily on days 2-5  Dispense: 6 tablet; Refill: 0  -     albuterol sulfate  (90 Base) MCG/ACT inhaler; Inhale 2 puffs Every 4 (Four) Hours As Needed for Wheezing.  Dispense: 8 g; Refill: 0    -Complete follow-up chest x-ray (order has been placed by Dr. Knowles) in the next 4 to 6 weeks.         Follow Up   Return if symptoms worsen or fail to improve, for Follow up with Dr. Knowles as needed..  Patient was given instructions and counseling regarding his condition or for health maintenance advice. Please see specific information pulled into the AVS if appropriate.         Answers submitted by the patient for this visit:  Primary Reason for Visit (Submitted on 10/27/2023)  What is the primary reason for your visit?: Cough

## 2023-10-30 ENCOUNTER — LAB (OUTPATIENT)
Dept: LAB | Facility: HOSPITAL | Age: 43
End: 2023-10-30
Payer: COMMERCIAL

## 2023-10-30 ENCOUNTER — OFFICE VISIT (OUTPATIENT)
Dept: ONCOLOGY | Facility: CLINIC | Age: 43
End: 2023-10-30
Payer: COMMERCIAL

## 2023-10-30 VITALS
SYSTOLIC BLOOD PRESSURE: 111 MMHG | OXYGEN SATURATION: 97 % | HEIGHT: 75 IN | HEART RATE: 80 BPM | DIASTOLIC BLOOD PRESSURE: 75 MMHG | BODY MASS INDEX: 31.87 KG/M2 | TEMPERATURE: 98 F | WEIGHT: 256.3 LBS

## 2023-10-30 DIAGNOSIS — C64.1 CANCER OF KIDNEY PARENCHYMA, RIGHT: ICD-10-CM

## 2023-10-30 DIAGNOSIS — C64.1 CANCER OF KIDNEY PARENCHYMA, RIGHT: Primary | ICD-10-CM

## 2023-10-30 LAB
ALBUMIN SERPL-MCNC: 4 G/DL (ref 3.5–5.2)
ALBUMIN/GLOB SERPL: 1.4 G/DL
ALP SERPL-CCNC: 65 U/L (ref 39–117)
ALT SERPL W P-5'-P-CCNC: 20 U/L (ref 1–41)
ANION GAP SERPL CALCULATED.3IONS-SCNC: 12.6 MMOL/L (ref 5–15)
AST SERPL-CCNC: 28 U/L (ref 1–40)
BASOPHILS # BLD AUTO: 0.03 10*3/MM3 (ref 0–0.2)
BASOPHILS NFR BLD AUTO: 0.4 % (ref 0–1.5)
BILIRUB SERPL-MCNC: 0.3 MG/DL (ref 0–1.2)
BUN SERPL-MCNC: 18 MG/DL (ref 6–20)
BUN/CREAT SERPL: 10.7 (ref 7–25)
CALCIUM SPEC-SCNC: 9.1 MG/DL (ref 8.6–10.5)
CHLORIDE SERPL-SCNC: 103 MMOL/L (ref 98–107)
CO2 SERPL-SCNC: 25.4 MMOL/L (ref 22–29)
CREAT SERPL-MCNC: 1.68 MG/DL (ref 0.7–1.3)
DEPRECATED RDW RBC AUTO: 39.7 FL (ref 37–54)
EGFRCR SERPLBLD CKD-EPI 2021: 50.8 ML/MIN/1.73
EOSINOPHIL # BLD AUTO: 0.11 10*3/MM3 (ref 0–0.4)
EOSINOPHIL NFR BLD AUTO: 1.6 % (ref 0.3–6.2)
ERYTHROCYTE [DISTWIDTH] IN BLOOD BY AUTOMATED COUNT: 12.5 % (ref 12.3–15.4)
GLOBULIN UR ELPH-MCNC: 2.8 GM/DL
GLUCOSE SERPL-MCNC: 88 MG/DL (ref 65–99)
HCT VFR BLD AUTO: 45.7 % (ref 37.5–51)
HGB BLD-MCNC: 15.1 G/DL (ref 13–17.7)
IMM GRANULOCYTES # BLD AUTO: 0.02 10*3/MM3 (ref 0–0.05)
IMM GRANULOCYTES NFR BLD AUTO: 0.3 % (ref 0–0.5)
LYMPHOCYTES # BLD AUTO: 1.36 10*3/MM3 (ref 0.7–3.1)
LYMPHOCYTES NFR BLD AUTO: 19.5 % (ref 19.6–45.3)
MCH RBC QN AUTO: 28.7 PG (ref 26.6–33)
MCHC RBC AUTO-ENTMCNC: 33 G/DL (ref 31.5–35.7)
MCV RBC AUTO: 86.7 FL (ref 79–97)
MONOCYTES # BLD AUTO: 0.44 10*3/MM3 (ref 0.1–0.9)
MONOCYTES NFR BLD AUTO: 6.3 % (ref 5–12)
NEUTROPHILS NFR BLD AUTO: 5 10*3/MM3 (ref 1.7–7)
NEUTROPHILS NFR BLD AUTO: 71.9 % (ref 42.7–76)
NRBC BLD AUTO-RTO: 0 /100 WBC (ref 0–0.2)
PLATELET # BLD AUTO: 308 10*3/MM3 (ref 140–450)
PMV BLD AUTO: 10 FL (ref 6–12)
POTASSIUM SERPL-SCNC: 4.5 MMOL/L (ref 3.5–5.2)
PROT SERPL-MCNC: 6.8 G/DL (ref 6–8.5)
RBC # BLD AUTO: 5.27 10*6/MM3 (ref 4.14–5.8)
SODIUM SERPL-SCNC: 141 MMOL/L (ref 136–145)
WBC NRBC COR # BLD: 6.96 10*3/MM3 (ref 3.4–10.8)

## 2023-10-30 PROCEDURE — 80053 COMPREHEN METABOLIC PANEL: CPT

## 2023-10-30 PROCEDURE — 36415 COLL VENOUS BLD VENIPUNCTURE: CPT

## 2023-10-30 PROCEDURE — 85025 COMPLETE CBC W/AUTO DIFF WBC: CPT

## 2023-10-30 NOTE — PROGRESS NOTES
[facility]    CONSULTING IN BLOOD DISORDERS & CANCER      REASON FOR CONSULTATION/CHIEF COMPLAINT:     Evaluation and management for bilateral renal cell carcinoma.                             REQUESTING PHYSICIAN: No ref. provider found  RECORDS OBTAINED:  Records of the patients history including those from the electronic medical record were reviewed and summarized in detail.    HISTORY OF PRESENT ILLNESS:    The patient is a 43 y.o. year old male with medical history significant for arthritis was incidentally noted to have a right renal mass on a CT scan in October 2022.  The CT A/P noted a complex solid and cystic 4.7 x 4.2 cm mass in the inter/lower pole of the right kidney.  Indeterminate 5 mm lesion interpolar left kidney.  MRI abdomen 2 noted right kidney lesion concerning for malignancy.  A tiny benign hemorrhagic/proteinaceous capsular cyst anterior left mid kidney and 3.1 cm benign left mid renal simple cyst noted.  Other benign cystic lesions in the liver noted as well.  It was seen by Dr. Elias, urology who performed right radical nephrectomy on 12/9/2022.     Pathology: Clear-cell renal cell carcinoma.  3.5 x 3.5 x 3.1 cm.  pT1.  Grade 3.  All margins negative.  No rhabdoid/sarcomatoid features.  No adjuvant therapy was administered.  Patient was under active surveillance.    A surveillance CT A/P performed for 6/29/2023 noted evidence of right nephrectomy with no sign of local tumor recurrence.  On the left kidney, there is a 0.7 cm solid enhancing mass at the anterior cortex of the interpolar portion of the kidney, minimally increased from 5 mm previously in October 2022.  There are other simple left renal cysts.    Dr. Elias had a discussed subsequent management options with observation, RF/cryoablation versus systemic therapy.    Patient has been referred to this clinic to discuss systemic treatment options for his small left renal lesion.  Patient currently denies any symptoms.  Denies  any urinary symptoms or abdominal pain.  No history of tobacco/alcohol/drug abuse.  Family history significant for father with history of prostatic cancer.  Patient is currently employed with the [employer], [employer] service.     10/25/2023: CT C/A/P with and without contrast shows Stable subcentimeter (7 mm) hyperdense left anterior interpolar renal mass with questionable   enhancement. Recommend continued attention on follow-up imaging    INTERIM HISTORY:  Patient returns to the clinic for a follow-up visit.  Reports of having been diagnosed with pneumonia recently.  States he is taking antibiotics currently. He was also diagnosed with COVID infection in August 2023.  Trying to maintain adequate hydration.  No other major health issues since last visit.    Past Medical History:   Diagnosis Date    Allergic     Pet dander; ceclore    Cancer     Kidney cancer on the right kidney; removal of two basal cell carcinomas    GERD (gastroesophageal reflux disease)     Torn meniscus      Past Surgical History:   Procedure Laterality Date    KNEE ARTHROSCOPY Right 02/2011    KNEE ARTHROSCOPY Left 01/1997       MEDICATIONS    Current Outpatient Medications:     acetaminophen (TYLENOL) 325 MG tablet, Take 1,000 mg by mouth., Disp: , Rfl:     albuterol sulfate  (90 Base) MCG/ACT inhaler, Inhale 2 puffs Every 4 (Four) Hours As Needed for Wheezing., Disp: 8 g, Rfl: 0    amoxicillin (AMOXIL) 500 MG capsule, Take 2 capsules by mouth 3 (Three) Times a Day for 5 days., Disp: 30 capsule, Rfl: 0    azithromycin (Zithromax Z-Alexander) 250 MG tablet, Take 2 tablets by mouth on day 1, then 1 tablet daily on days 2-5, Disp: 6 tablet, Rfl: 0    pantoprazole (PROTONIX) 40 MG EC tablet, Take 1 tablet by mouth Daily. Taper off of medication slowly once improving., Disp: 90 tablet, Rfl: 3    ALLERGIES:     Allergies   Allergen Reactions    Ceclor [Cefaclor] Hives       SOCIAL HISTORY:       Social History     Socioeconomic History     "Marital status:    Tobacco Use    Smoking status: Never    Smokeless tobacco: Never   Vaping Use    Vaping Use: Never used   Substance and Sexual Activity    Alcohol use: Yes     Alcohol/week: 1.0 - 2.0 standard drink of alcohol     Types: 1 - 2 Cans of beer per week    Drug use: Not Currently     Types: Marijuana    Sexual activity: Yes     Partners: Female         FAMILY HISTORY:  Family History   Problem Relation Age of Onset    Leukemia Mother     Heart disease Father     Hypertension Father     Prostate cancer Father     Rheum arthritis Father     Depression Father     Alcohol abuse Father     Cancer Father         Prostate cancer; bone cancer       REVIEW OF SYSTEMS:  As per HPI         Vitals:    10/30/23 1345   BP: 111/75   Pulse: 80   Temp: 98 °F (36.7 °C)   TempSrc: Temporal   SpO2: 97%   Weight: 116 kg (256 lb 4.8 oz)   Height: 190.5 cm (75\")   PainSc: 0-No pain           10/30/2023     1:44 PM   Current Status   ECOG score 0      PHYSICAL EXAM:    CONSTITUTIONAL:  Vital signs reviewed.  No distress, looks comfortable.  EYES:  Conjunctiva and lids unremarkable.    EARS,NOSE,MOUTH,THROAT:  Ears and nose appear unremarkable.    RESPIRATORY:  Normal respiratory effort.  Lungs clear to auscultation bilaterally.  CARDIOVASCULAR:  Normal S1, S2.  No murmurs rubs or gallops.  No significant lower extremity edema.  GASTROINTESTINAL: Abdomen appears unremarkable.  Nondistended   LYMPHATIC:  No cervical, supraclavicular lymphadenopathy.  SKIN:  Warm.  No rashes.  PSYCHIATRIC:  Normal judgment and insight.  Normal mood and affect.  NEURO: AAOx3, no obvious focal deficits.    RECENT LABS:        Lab on 10/30/2023   Component Date Value Ref Range Status    WBC 10/30/2023 6.96  3.40 - 10.80 10*3/mm3 Final    RBC 10/30/2023 5.27  4.14 - 5.80 10*6/mm3 Final    Hemoglobin 10/30/2023 15.1  13.0 - 17.7 g/dL Final    Hematocrit 10/30/2023 45.7  37.5 - 51.0 % Final    MCV 10/30/2023 86.7  79.0 - 97.0 fL Final    MCH " 10/30/2023 28.7  26.6 - 33.0 pg Final    MCHC 10/30/2023 33.0  31.5 - 35.7 g/dL Final    RDW 10/30/2023 12.5  12.3 - 15.4 % Final    RDW-SD 10/30/2023 39.7  37.0 - 54.0 fl Final    MPV 10/30/2023 10.0  6.0 - 12.0 fL Final    Platelets 10/30/2023 308  140 - 450 10*3/mm3 Final    Neutrophil % 10/30/2023 71.9  42.7 - 76.0 % Final    Lymphocyte % 10/30/2023 19.5 (L)  19.6 - 45.3 % Final    Monocyte % 10/30/2023 6.3  5.0 - 12.0 % Final    Eosinophil % 10/30/2023 1.6  0.3 - 6.2 % Final    Basophil % 10/30/2023 0.4  0.0 - 1.5 % Final    Immature Grans % 10/30/2023 0.3  0.0 - 0.5 % Final    Neutrophils, Absolute 10/30/2023 5.00  1.70 - 7.00 10*3/mm3 Final    Lymphocytes, Absolute 10/30/2023 1.36  0.70 - 3.10 10*3/mm3 Final    Monocytes, Absolute 10/30/2023 0.44  0.10 - 0.90 10*3/mm3 Final    Eosinophils, Absolute 10/30/2023 0.11  0.00 - 0.40 10*3/mm3 Final    Basophils, Absolute 10/30/2023 0.03  0.00 - 0.20 10*3/mm3 Final    Immature Grans, Absolute 10/30/2023 0.02  0.00 - 0.05 10*3/mm3 Final    nRBC 10/30/2023 0.0  0.0 - 0.2 /100 WBC Final         ASSESSMENT:  Mr. Simpson is a pleasant 42-year-old male with medical history significant for arthritis comes for left renal lesion evaluation and management.      #Suspected left renal RCC:   Patient is currently on active surveillance for right RCC which was removed in December 2022.    The surveillance CT A/P performed for 6/29/2023 (performed at [facility] urology) noted a 0.7 cm solid enhancing mass at the anterior cortex of the interpolar portion of the left kidney, minimally increased from 5 mm previously in October 2022.  There are other simple left renal cysts.  No evidence of disease recurrence on the right nephrectomy bed.   I reviewed the radiologic findings and various management options which nclude observation, RF/cryoablation versus systemic therapy.  Dr. Elias too has discussed surgical as well as conservative management options with him.  Given its small size, the  benefit of oral TKI (?  Sunitinib) or immunotherapy (pembrolizumab) would be uncertain.  Discussed plan to refer to IR to discuss risk/benefit of radiofrequency ablation and cryoablation therapies. Dr. Elias thinks it will be a difficult partial nephrectomy due to size and difficult to find location.  He also thinks patient is not a candidate for cryotherapy or RF due to location and size.  Case discussed with Dr. Adames, IR. On his and uroradiologist's review, the left renal lesion appears to be a proteinaceous cyst.  Spoke with patient overphone.  Plan made for active surveillance at this time.  Patient is currently scheduled for repeat CT scan in October 2023 with Dr. Elias's office.    10/25/2023: CT C/A/P with and without contrast shows Stable subcentimeter (7 mm) hyperdense left anterior interpolar renal mass with questionable enhancement.  10/30/2023: Patient remains asymptomatic.  Reviewed the recent CT scan performed at [facility] urology which shows stable left renal lesion.  Discussed plan to continue to monitor.  Says Dr. Elias has planned to repeat scans every 3 months.  History maintain close follow-up with Dr. Elias.  I will plan to see him back in 6 months or sooner if needed.    #Right clear-cell renal cell carcinoma:  The CT A/P noted a complex solid and cystic 4.7 x 4.2 cm mass in the inter/lower pole of the right kidney.  Indeterminate 5 mm lesion interpolar left kidney.   MRI abdomen 2 noted right kidney lesion concerning for malignancy.  A tiny benign hemorrhagic/proteinaceous capsular cyst anterior left mid kidney and 3.1 cm benign left mid renal simple cyst noted.  Other benign cystic lesions in the liver noted as well.   It was seen by Dr. Elias, urology who performed right radical nephrectomy on 12/9/2022.   Pathology: Clear-cell renal cell carcinoma.  3.5 x 3.5 x 3.1 cm.  pT1.  Grade 3.  All margins negative.  No rhabdoid/sarcomatoid features.  No adjuvant therapy was  administered.  Patient was under active surveillance.  Repeat CT scans from October 2023 showed no evidence of disease recurrence.    #Encounter for genetic counseling:  Given his young age of diagnosis and family history significant for prostate cancer, [facility] germline testing was performed which came back negative.  7/31/2023: Patient's father was diagnosed with prostate cancer in his 80s.  Given his young age, advised PSA screening annually.    10/30/2023: Most recent PSA from 10/27/2023 normal at 0.8    #Left sided pneumonia (10/3/2023): On amoxicillin and azithromycin as per PCP    #CKD-3:   Serum creatinine elevated at 1.68, EGFR 50.8 on 10/30/2023.  Encouraged to maintain adequate hydration and avoid nephrotoxic agents.    PLAN:   -Continue active surveillance  -Surveillance CT scans as per Dr. Elias, [facility] urology  - MD follow-up in 6 months after scans or sooner if needed    Orders Placed This Encounter   Procedures    Comprehensive Metabolic Panel     Standing Status:   Future     Number of Occurrences:   1     Standing Expiration Date:   10/24/2024     Order Specific Question:   Release to patient     Answer:   Routine Release [[id number]]    CBC & Differential     Standing Status:   Future     Number of Occurrences:   1     Standing Expiration Date:   10/24/2024     Order Specific Question:   Manual Differential     Answer:   No     Order Specific Question:   Release to patient     Answer:   Routine Release [[id number]]   Total time spent during this patient encounter is 45 minutes. The total time spent with the patient includes at least one or more of the following: preparing to see the patient by reviewing of tests, prior notes or other relevant information, performing appropriate independent examination & evaluation, counseling, ordering of medications, tests or procedures, communicating with other healthcare professionals, when appropriate to coordinate care, documenting clinic information in  the electronic medical records or other health records, independently interpreting results of tests and communicating the results to the patient/family or caregiver.

## 2023-11-01 ENCOUNTER — OFFICE VISIT (OUTPATIENT)
Dept: ORTHOPEDIC SURGERY | Facility: CLINIC | Age: 43
End: 2023-11-01
Payer: COMMERCIAL

## 2023-11-01 VITALS
WEIGHT: 256 LBS | HEIGHT: 75 IN | HEART RATE: 80 BPM | BODY MASS INDEX: 31.83 KG/M2 | DIASTOLIC BLOOD PRESSURE: 72 MMHG | SYSTOLIC BLOOD PRESSURE: 115 MMHG

## 2023-11-01 DIAGNOSIS — M17.11 PRIMARY OSTEOARTHRITIS OF RIGHT KNEE: ICD-10-CM

## 2023-11-01 DIAGNOSIS — M25.561 RIGHT KNEE PAIN, UNSPECIFIED CHRONICITY: Primary | ICD-10-CM

## 2023-11-01 PROCEDURE — 73562 X-RAY EXAM OF KNEE 3: CPT | Performed by: ORTHOPAEDIC SURGERY

## 2023-11-01 PROCEDURE — 99203 OFFICE O/P NEW LOW 30 MIN: CPT | Performed by: ORTHOPAEDIC SURGERY

## 2023-11-01 PROCEDURE — 20610 DRAIN/INJ JOINT/BURSA W/O US: CPT | Performed by: ORTHOPAEDIC SURGERY

## 2023-11-01 RX ORDER — TRIAMCINOLONE ACETONIDE 40 MG/ML
80 INJECTION, SUSPENSION INTRA-ARTICULAR; INTRAMUSCULAR
Status: COMPLETED | OUTPATIENT
Start: 2023-11-01 | End: 2023-11-01

## 2023-11-01 RX ORDER — LIDOCAINE HYDROCHLORIDE 10 MG/ML
4 INJECTION, SOLUTION EPIDURAL; INFILTRATION; INTRACAUDAL; PERINEURAL
Status: COMPLETED | OUTPATIENT
Start: 2023-11-01 | End: 2023-11-01

## 2023-11-01 RX ADMIN — LIDOCAINE HYDROCHLORIDE 4 ML: 10 INJECTION, SOLUTION EPIDURAL; INFILTRATION; INTRACAUDAL; PERINEURAL at 13:40

## 2023-11-01 RX ADMIN — TRIAMCINOLONE ACETONIDE 80 MG: 40 INJECTION, SUSPENSION INTRA-ARTICULAR; INTRAMUSCULAR at 13:40

## 2023-11-01 NOTE — PROGRESS NOTES
Subjective: Right knee pain     Patient ID: Chi Simpson is a 43 y.o. male.    Chief Complaint:    History of Present Illness 43-year-old male seen today for the first time regarding his right knee.  Symptomatic since October 8.  States he was playing field hockey with his daughter involved in a game and after that the knee started becoming symptomatic swollen and painful.  The swelling for the most part has subsided and his pain is much better than it was initially now states it is only 1 out of 10 but does get some dull achy discomfort in the knee with some popping or clicking noise.  Has trouble standing working walking and climbing stairs.  He is unable to take any anti-inflammatories as he has had a nephrectomy for renal cancer 2022 from that she is doing well.  He had a meniscectomy in that knee 10 to 12 years ago in [place] and has had occasional discomfort but not as significant or severe as the past 3 to 4 weeks.       Social History     Occupational History    Not on file   Tobacco Use    Smoking status: Never    Smokeless tobacco: Never   Vaping Use    Vaping Use: Never used   Substance and Sexual Activity    Alcohol use: Yes     Alcohol/week: 1.0 - 2.0 standard drink of alcohol     Types: 1 - 2 Cans of beer per week    Drug use: Not Currently     Types: Marijuana    Sexual activity: Yes     Partners: Female      Review of Systems      Past Medical History:   Diagnosis Date    Allergic     Pet dander; ceclore    Ankle sprain     Cancer     Kidney cancer on the right kidney; removal of two basal cell carcinomas    Dislocation of finger     Frozen shoulder July 2022    Had arthroscopic surgery on LT shoulder and it is still stiff    GERD (gastroesophageal reflux disease)     Knee swelling     Low back strain     Rotator cuff syndrome     Tear of meniscus of knee     Torn meniscus      Past Surgical History:   Procedure Laterality Date    KNEE ARTHROSCOPY Right 02/2011    KNEE ARTHROSCOPY Left 01/1997     NEPHRECTOMY Right     12/2022    SHOULDER SURGERY       Family History   Problem Relation Age of Onset    Leukemia Mother     Heart disease Father     Hypertension Father     Prostate cancer Father     Rheum arthritis Father     Depression Father     Alcohol abuse Father     Cancer Father         Prostate cancer; bone cancer         Objective:  Vitals:    11/01/23 1317   BP: 115/72   Pulse: 80         11/01/23  1317   Weight: 116 kg (256 lb)     Body mass index is 32 kg/m².        Ortho Exam   AP lateral sunrise view of the knee does show some medial arthrosis DePuy in the lateral and he has some spurring in the sunrise view of that knee.  No acute changes noted no prior x-rays available.  He is alert and oriented x3.  Has normocephalic and sclerae clear.  The knee shows no swelling effusion erythema tendon is cool to touch but he does have mild crepitus with range of motion to 0 to 125 degrees.  Quad hamstring function 5/5.  No instability in flexion extension nor any varus or valgus instability at 10 to 30 degrees.  There is minimal joint line tenderness and negative Michael's.  His calf is nontender.  His distal pulses no motor or sensory deficit.    Assessment:        1. Right knee pain, unspecified chronicity    2. Primary osteoarthritis of right knee           Plan: Reviewed with the patient his x-rays history and physical exam.  He has pre-existing arthritis he aggravated was playing field hockey with his daughter.  As he cannot take oral anti-inflammatories after discussion of treatment options were going proceed with a cortisone injection of 4 cc of lidocaine and 2 cc of Kenalog given to the superolateral portal after sterile prepping.  Postinjection instructions given.  I did also advised on the use of Voltaren gel but I told him to contact his urologist to be sure that they have no objection to him using the cream with his history of renal cancer.  Return to see me in a month if still symptomatic.   Answered all questions      Large Joint Arthrocentesis: R knee  Date/Time: 11/1/2023 1:40 PM  Consent given by: patient  Site marked: site marked  Timeout: Immediately prior to procedure a time out was called to verify the correct patient, procedure, equipment, support staff and site/side marked as required   Supporting Documentation  Indications: pain   Procedure Details  Location: knee - R knee  Preparation: Patient was prepped and draped in the usual sterile fashion  Needle size: 22 G  Approach: superior  Medications administered: 4 mL lidocaine PF 1% 1 %; 80 mg triamcinolone acetonide 40 MG/ML  Patient tolerance: patient tolerated the procedure well with no immediate complications            Work Status:    IL[facility] query complete.    Orders:  Orders Placed This Encounter   Procedures    Large Joint Arthrocentesis: R knee    XR Knee 3+ View With [place] Right       Medications:  No orders of the defined types were placed in this encounter.      Followup:  Return in about 4 weeks (around 11/29/2023).          Dictated utilizing Dragon dictation

## 2024-01-08 ENCOUNTER — TELEPHONE (OUTPATIENT)
Dept: INTERNAL MEDICINE | Facility: CLINIC | Age: 44
End: 2024-01-08

## 2024-01-08 NOTE — TELEPHONE ENCOUNTER
"Caller: Chi Simpson \"Jose\"    Relationship: Self    Best call back number: [phone]    What orders are you requesting (i.e. lab or imaging): CHEST XRAY    In what timeframe would the patient need to come in: ASAP    Where will you receive your lab/imaging services:     Additional notes: PATIENT STATED HE WAS ADVISED HE NEEDED A FOLLOW UP XRAY AND THAT HE COULD GO [facility].    HE STATED HE WENT TO [facility] AND THEY DID NOT HAVE AN ORDER.    HE WOULD LIKE A CALL ONCE ORDER IS PLACED.  "

## 2024-01-08 NOTE — TELEPHONE ENCOUNTER
Pt advised that order is with [facility] and he can go to any diagnostic center for test.  Pt expressed understanding.  No further questions at this time.

## 2024-01-29 PROBLEM — Z90.5 H/O RIGHT NEPHRECTOMY: Status: ACTIVE | Noted: 2024-01-29

## 2024-01-29 PROBLEM — Z85.528 HISTORY OF RENAL CELL CARCINOMA: Status: ACTIVE | Noted: 2024-01-29

## 2024-03-05 ENCOUNTER — OFFICE VISIT (OUTPATIENT)
Dept: INTERNAL MEDICINE | Facility: CLINIC | Age: 44
End: 2024-03-05
Payer: COMMERCIAL

## 2024-03-05 VITALS — TEMPERATURE: 97.5 F | HEART RATE: 56 BPM | OXYGEN SATURATION: 95 %

## 2024-03-05 DIAGNOSIS — J10.1 INFLUENZA B: Primary | ICD-10-CM

## 2024-03-05 PROCEDURE — 99214 OFFICE O/P EST MOD 30 MIN: CPT | Performed by: FAMILY MEDICINE

## 2024-03-05 RX ORDER — OSELTAMIVIR PHOSPHATE 75 MG/1
75 CAPSULE ORAL 2 TIMES DAILY
Qty: 10 CAPSULE | Refills: 0 | Status: SHIPPED | OUTPATIENT
Start: 2024-03-05 | End: 2024-03-10

## 2024-03-05 NOTE — LETTER
March 5, 2024     Patient: Chi Simpson   YOB: 1980   Date of Visit: 3/5/2024       To Whom It May Concern:    It is my medical opinion that due to an illness Chi Simpson should remain out of work through 3/8/2024. If his symptoms improve he may return to work earlier.            Sincerely,        Diego Knowles MD

## 2024-03-05 NOTE — PROGRESS NOTES
Date of Encounter: 2024  Patient: Chi Simpson,  1980    Subjective   History of Presenting Illness  Chief complaint: Cough    Since about last Friday patient has been having cough, congestion, sore throat, and myalgias.  He denies fever, chills, headaches, nausea, vomiting, diarrhea, rash, abdominal pain, shortness of breath.  One of his children was diagnosed with influenza B last week.  He was also recently on a work trip.  No obvious sick contacts.  He is not improving but also not worsening.  He is taking Tylenol for symptomatic relief.  Point-of-care testing is positive for influenza B.    The following portions of the patient's history were reviewed and updated as appropriate: allergies, current medications, past family history, past medical history, past social history, past surgical history and problem list.    Patient Active Problem List   Diagnosis    Gastroesophageal reflux disease with esophagitis without hemorrhage    Right-sided tinnitus    Hyperlipidemia    Mallet finger, left    Deviated septum    H/O right nephrectomy    History of renal cell carcinoma     Past Medical History:   Diagnosis Date    Allergic     Pet dander; ceclore    Ankle sprain     Cancer     Kidney cancer on the right kidney; removal of two basal cell carcinomas    Dislocation of finger     Frozen shoulder 2022    Had arthroscopic surgery on LT shoulder and it is still stiff    GERD (gastroesophageal reflux disease)     Knee swelling     Low back strain     Rotator cuff syndrome     Tear of meniscus of knee     Torn meniscus      Past Surgical History:   Procedure Laterality Date    KNEE ARTHROSCOPY Right 2011    KNEE ARTHROSCOPY Left 1997    NEPHRECTOMY Right     2022    SHOULDER SURGERY       Family History   Problem Relation Age of Onset    Leukemia Mother     Heart disease Father     Hypertension Father     Prostate cancer Father     Rheum arthritis Father     Depression Father     Alcohol abuse  Father     Cancer Father         Prostate cancer; bone cancer       Current Outpatient Medications:     acetaminophen (TYLENOL) 325 MG tablet, Take 1,000 mg by mouth., Disp: , Rfl:     pantoprazole (PROTONIX) 40 MG EC tablet, Take 1 tablet by mouth Daily. Taper off of medication slowly once improving., Disp: 90 tablet, Rfl: 3  Allergies   Allergen Reactions    Ceclor [Cefaclor] Hives     Social History     Tobacco Use    Smoking status: Never    Smokeless tobacco: Never   Vaping Use    Vaping status: Never Used   Substance Use Topics    Alcohol use: Yes     Alcohol/week: 1.0 - 2.0 standard drink of alcohol     Types: 1 - 2 Cans of beer per week    Drug use: Not Currently     Types: Marijuana          Objective   Physical Exam  Vitals:    03/05/24 0901   Pulse: 56   Temp: 97.5 °F (36.4 °C)   TempSrc: Infrared   SpO2: 95%     There is no height or weight on file to calculate BMI.    Constitutional: NAD.  Eyes: EOMI. PERRLA. Normal conjunctiva.  Ear, nose, mouth, throat: Postnasal drip.  No tonsillar exudates or erythema.  Boggy nasal mucosa with clear rhinorrhea.  Normal external ear canals and TMs bilaterally.  Cardiovascular: RRR. No murmurs. No LE edema b/l. Radial pulses 2+ bilaterally.  Pulmonary: CTA b/l. Good effort.  Some mild coarseness centrally with coughing.  Integumentary: No rashes or wounds on face or upper extremities.  Lymphatic: No anterior cervical lymphadenopathy.  Endocrine: No thyromegaly or palpable thyroid nodules.  Psychiatric: Normal affect. Normal thought content.     Assessment & Plan   Assessment and Plan  Pleasant 43-year-old male with hyperlipidemia, GERD, history of renal cell carcinoma status post right nephrectomy, who presents with the following:      Diagnoses and all orders for this visit:    1. Influenza B (Primary)  -     oseltamivir (Tamiflu) 75 MG capsule; Take 1 capsule by mouth 2 (Two) Times a Day for 5 days.  Dispense: 10 capsule; Refill: 0    Recommend treatment with  Tamiflu based upon history.  Offered chemoprophylaxis for his wife and his 22-year-old son who lives at home. Discussed risks and benefits of these approaches.  Discussed reasons to return further evaluation.     Diego Knowles MD  Family Medicine  O: [phone]    Disclaimer: Parts of this note were dictated by speech recognition. Minor errors in transcription may be present. Please call if questions.

## 2024-04-15 ENCOUNTER — LAB (OUTPATIENT)
Dept: LAB | Facility: HOSPITAL | Age: 44
End: 2024-04-15
Payer: COMMERCIAL

## 2024-04-15 ENCOUNTER — OFFICE VISIT (OUTPATIENT)
Dept: ONCOLOGY | Facility: CLINIC | Age: 44
End: 2024-04-15
Payer: COMMERCIAL

## 2024-04-15 VITALS
SYSTOLIC BLOOD PRESSURE: 120 MMHG | DIASTOLIC BLOOD PRESSURE: 77 MMHG | WEIGHT: 255.5 LBS | RESPIRATION RATE: 16 BRPM | HEIGHT: 75 IN | HEART RATE: 53 BPM | TEMPERATURE: 98.7 F | BODY MASS INDEX: 31.77 KG/M2 | OXYGEN SATURATION: 98 %

## 2024-04-15 DIAGNOSIS — C64.1 CANCER OF KIDNEY PARENCHYMA, RIGHT: ICD-10-CM

## 2024-04-15 DIAGNOSIS — C64.1 CANCER OF KIDNEY PARENCHYMA, RIGHT: Primary | ICD-10-CM

## 2024-04-15 LAB
ALBUMIN SERPL-MCNC: 4.2 G/DL (ref 3.5–5.2)
ALBUMIN/GLOB SERPL: 1.5 G/DL
ALP SERPL-CCNC: 57 U/L (ref 39–117)
ALT SERPL W P-5'-P-CCNC: 25 U/L (ref 1–41)
ANION GAP SERPL CALCULATED.3IONS-SCNC: 8.9 MMOL/L (ref 5–15)
AST SERPL-CCNC: 20 U/L (ref 1–40)
BASOPHILS # BLD AUTO: 0.03 10*3/MM3 (ref 0–0.2)
BASOPHILS NFR BLD AUTO: 0.5 % (ref 0–1.5)
BILIRUB SERPL-MCNC: 0.4 MG/DL (ref 0–1.2)
BUN SERPL-MCNC: 20 MG/DL (ref 6–20)
BUN/CREAT SERPL: 11.7 (ref 7–25)
CALCIUM SPEC-SCNC: 9.4 MG/DL (ref 8.6–10.5)
CHLORIDE SERPL-SCNC: 108 MMOL/L (ref 98–107)
CO2 SERPL-SCNC: 26.1 MMOL/L (ref 22–29)
CREAT SERPL-MCNC: 1.71 MG/DL (ref 0.76–1.27)
DEPRECATED RDW RBC AUTO: 39.8 FL (ref 37–54)
EGFRCR SERPLBLD CKD-EPI 2021: 49.7 ML/MIN/1.73
EOSINOPHIL # BLD AUTO: 0.22 10*3/MM3 (ref 0–0.4)
EOSINOPHIL NFR BLD AUTO: 4 % (ref 0.3–6.2)
ERYTHROCYTE [DISTWIDTH] IN BLOOD BY AUTOMATED COUNT: 12.5 % (ref 12.3–15.4)
GLOBULIN UR ELPH-MCNC: 2.8 GM/DL
GLUCOSE SERPL-MCNC: 86 MG/DL (ref 65–99)
HCT VFR BLD AUTO: 44.5 % (ref 37.5–51)
HGB BLD-MCNC: 14.9 G/DL (ref 13–17.7)
IMM GRANULOCYTES # BLD AUTO: 0.01 10*3/MM3 (ref 0–0.05)
IMM GRANULOCYTES NFR BLD AUTO: 0.2 % (ref 0–0.5)
LYMPHOCYTES # BLD AUTO: 1.53 10*3/MM3 (ref 0.7–3.1)
LYMPHOCYTES NFR BLD AUTO: 28 % (ref 19.6–45.3)
MCH RBC QN AUTO: 29.3 PG (ref 26.6–33)
MCHC RBC AUTO-ENTMCNC: 33.5 G/DL (ref 31.5–35.7)
MCV RBC AUTO: 87.6 FL (ref 79–97)
MONOCYTES # BLD AUTO: 0.36 10*3/MM3 (ref 0.1–0.9)
MONOCYTES NFR BLD AUTO: 6.6 % (ref 5–12)
NEUTROPHILS NFR BLD AUTO: 3.32 10*3/MM3 (ref 1.7–7)
NEUTROPHILS NFR BLD AUTO: 60.7 % (ref 42.7–76)
NRBC BLD AUTO-RTO: 0 /100 WBC (ref 0–0.2)
PLATELET # BLD AUTO: 261 10*3/MM3 (ref 140–450)
PMV BLD AUTO: 9.9 FL (ref 6–12)
POTASSIUM SERPL-SCNC: 4.3 MMOL/L (ref 3.5–5.2)
PROT SERPL-MCNC: 7 G/DL (ref 6–8.5)
RBC # BLD AUTO: 5.08 10*6/MM3 (ref 4.14–5.8)
SODIUM SERPL-SCNC: 143 MMOL/L (ref 136–145)
WBC NRBC COR # BLD AUTO: 5.47 10*3/MM3 (ref 3.4–10.8)

## 2024-04-15 PROCEDURE — 80053 COMPREHEN METABOLIC PANEL: CPT

## 2024-04-15 PROCEDURE — 99215 OFFICE O/P EST HI 40 MIN: CPT | Performed by: INTERNAL MEDICINE

## 2024-04-15 PROCEDURE — 36415 COLL VENOUS BLD VENIPUNCTURE: CPT

## 2024-04-15 PROCEDURE — 85025 COMPLETE CBC W/AUTO DIFF WBC: CPT

## 2024-04-15 NOTE — PROGRESS NOTES
[facility]    CONSULTING IN BLOOD DISORDERS & CANCER      REASON FOR CONSULTATION/CHIEF COMPLAINT:     Evaluation and management for bilateral renal cell carcinoma.                             REQUESTING PHYSICIAN: No ref. provider found  RECORDS OBTAINED:  Records of the patients history including those from the electronic medical record were reviewed and summarized in detail.    HISTORY OF PRESENT ILLNESS:    The patient is a 43 y.o. year old male with medical history significant for arthritis was incidentally noted to have a right renal mass on a CT scan in October 2022.  The CT A/P noted a complex solid and cystic 4.7 x 4.2 cm mass in the inter/lower pole of the right kidney.  Indeterminate 5 mm lesion interpolar left kidney.  MRI abdomen 2 noted right kidney lesion concerning for malignancy.  A tiny benign hemorrhagic/proteinaceous capsular cyst anterior left mid kidney and 3.1 cm benign left mid renal simple cyst noted.  Other benign cystic lesions in the liver noted as well.  It was seen by Dr. Elias, urology who performed right radical nephrectomy on 12/9/2022.     Pathology: Clear-cell renal cell carcinoma.  3.5 x 3.5 x 3.1 cm.  pT1.  Grade 3.  All margins negative.  No rhabdoid/sarcomatoid features.  No adjuvant therapy was administered.  Patient was under active surveillance.    A surveillance CT A/P performed for 6/29/2023 noted evidence of right nephrectomy with no sign of local tumor recurrence.  On the left kidney, there is a 0.7 cm solid enhancing mass at the anterior cortex of the interpolar portion of the kidney, minimally increased from 5 mm previously in October 2022.  There are other simple left renal cysts.    Dr. Elias had a discussed subsequent management options with observation, RF/cryoablation versus systemic therapy.    Patient has been referred to this clinic to discuss systemic treatment options for his small left renal lesion.  Patient currently denies any symptoms.  Denies  any urinary symptoms or abdominal pain.  No history of tobacco/alcohol/drug abuse.  Family history significant for father with history of prostatic cancer.  Patient is currently employed with the [employer], [employer] service.     10/25/2023: CT C/A/P with and without contrast shows Stable subcentimeter (7 mm) hyperdense left anterior interpolar renal mass with questionable   enhancement. Recommend continued attention on follow-up imaging    January 2024: CT C/A/P shows stable left renal lesion ~8 mm, likely benign proteinaceous cyst.  No other findings of disease recurrence    INTERIM HISTORY:  Patient returns to the clinic for a follow-up visit.  No major health issues since last visit.  Trying to maintain adequate hydration.  No new medications.    Past Medical History:   Diagnosis Date    Allergic     Pet dander; ceclore    Ankle sprain     Cancer     Kidney cancer on the right kidney; removal of two basal cell carcinomas    Dislocation of finger     Frozen shoulder July 2022    Had arthroscopic surgery on LT shoulder and it is still stiff    GERD (gastroesophageal reflux disease)     Knee swelling     Low back strain     Rotator cuff syndrome     Tear of meniscus of knee     Torn meniscus      Past Surgical History:   Procedure Laterality Date    KNEE ARTHROSCOPY Right 02/2011    KNEE ARTHROSCOPY Left 01/1997    NEPHRECTOMY Right     12/2022    SHOULDER SURGERY         MEDICATIONS    Current Outpatient Medications:     acetaminophen (TYLENOL) 325 MG tablet, Take 1,000 mg by mouth., Disp: , Rfl:     pantoprazole (PROTONIX) 40 MG EC tablet, Take 1 tablet by mouth Daily. Taper off of medication slowly once improving., Disp: 90 tablet, Rfl: 3    ALLERGIES:     Allergies   Allergen Reactions    Ceclor [Cefaclor] Hives       SOCIAL HISTORY:       Social History     Socioeconomic History    Marital status:    Tobacco Use    Smoking status: Never    Smokeless tobacco: Never   Vaping Use    Vaping status:  "Never Used   Substance and Sexual Activity    Alcohol use: Yes     Alcohol/week: 1.0 - 2.0 standard drink of alcohol     Types: 1 - 2 Cans of beer per week    Drug use: Not Currently     Types: Marijuana    Sexual activity: Yes     Partners: Female         FAMILY HISTORY:  Family History   Problem Relation Age of Onset    Leukemia Mother     Heart disease Father     Hypertension Father     Prostate cancer Father     Rheum arthritis Father     Depression Father     Alcohol abuse Father     Cancer Father         Prostate cancer; bone cancer       REVIEW OF SYSTEMS:  As per HPI         Vitals:    04/15/24 1240   BP: 120/77   Pulse: 53   Resp: 16   Temp: 98.7 °F (37.1 °C)   TempSrc: Temporal   SpO2: 98%   Weight: 116 kg (255 lb 8 oz)   Height: 190.5 cm (75\")   PainSc: 0-No pain             10/30/2023     1:44 PM   Current Status   ECOG score 0      PHYSICAL EXAM:    CONSTITUTIONAL:  Vital signs reviewed.  No distress, looks comfortable.  EYES:  Conjunctiva and lids unremarkable.    EARS,NOSE,MOUTH,THROAT:  Ears and nose appear unremarkable.    RESPIRATORY:  Normal respiratory effort.  Lungs clear to auscultation bilaterally.  CARDIOVASCULAR:  Normal S1, S2.  No murmurs rubs or gallops.  No significant lower extremity edema.  GASTROINTESTINAL: Abdomen appears unremarkable.  Nondistended   LYMPHATIC:  No cervical, supraclavicular lymphadenopathy.  SKIN:  Warm.  No rashes.  PSYCHIATRIC:  Normal judgment and insight.  Normal mood and affect.  NEURO: AAOx3, no obvious focal deficits.    RECENT LABS:        Lab on 04/15/2024   Component Date Value Ref Range Status    Glucose 04/15/2024 86  65 - 99 mg/dL Final    BUN 04/15/2024 20  6 - 20 mg/dL Final    Creatinine 04/15/2024 1.71 (H)  0.76 - 1.27 mg/dL Final    Sodium 04/15/2024 143  136 - 145 mmol/L Final    Potassium 04/15/2024 4.3  3.5 - 5.2 mmol/L Final    Chloride 04/15/2024 108 (H)  98 - 107 mmol/L Final    CO2 04/15/2024 26.1  22.0 - 29.0 mmol/L Final    Calcium " 04/15/2024 9.4  8.6 - 10.5 mg/dL Final    Total Protein 04/15/2024 7.0  6.0 - 8.5 g/dL Final    Albumin 04/15/2024 4.2  3.5 - 5.2 g/dL Final    ALT (SGPT) 04/15/2024 25  1 - 41 U/L Final    AST (SGOT) 04/15/2024 20  1 - 40 U/L Final    Alkaline Phosphatase 04/15/2024 57  39 - 117 U/L Final    Total Bilirubin 04/15/2024 0.4  0.0 - 1.2 mg/dL Final    Globulin 04/15/2024 2.8  gm/dL Final    A/G Ratio 04/15/2024 1.5  g/dL Final    BUN/Creatinine Ratio 04/15/2024 11.7  7.0 - 25.0 Final    Anion Gap 04/15/2024 8.9  5.0 - 15.0 mmol/L Final    eGFR 04/15/2024 49.7 (L)  >60.0 mL/min/1.73 Final    WBC 04/15/2024 5.47  3.40 - 10.80 10*3/mm3 Final    RBC 04/15/2024 5.08  4.14 - 5.80 10*6/mm3 Final    Hemoglobin 04/15/2024 14.9  13.0 - 17.7 g/dL Final    Hematocrit 04/15/2024 44.5  37.5 - 51.0 % Final    MCV 04/15/2024 87.6  79.0 - 97.0 fL Final    MCH 04/15/2024 29.3  26.6 - 33.0 pg Final    MCHC 04/15/2024 33.5  31.5 - 35.7 g/dL Final    RDW 04/15/2024 12.5  12.3 - 15.4 % Final    RDW-SD 04/15/2024 39.8  37.0 - 54.0 fl Final    MPV 04/15/2024 9.9  6.0 - 12.0 fL Final    Platelets 04/15/2024 261  140 - 450 10*3/mm3 Final    Neutrophil % 04/15/2024 60.7  42.7 - 76.0 % Final    Lymphocyte % 04/15/2024 28.0  19.6 - 45.3 % Final    Monocyte % 04/15/2024 6.6  5.0 - 12.0 % Final    Eosinophil % 04/15/2024 4.0  0.3 - 6.2 % Final    Basophil % 04/15/2024 0.5  0.0 - 1.5 % Final    Immature Grans % 04/15/2024 0.2  0.0 - 0.5 % Final    Neutrophils, Absolute 04/15/2024 3.32  1.70 - 7.00 10*3/mm3 Final    Lymphocytes, Absolute 04/15/2024 1.53  0.70 - 3.10 10*3/mm3 Final    Monocytes, Absolute 04/15/2024 0.36  0.10 - 0.90 10*3/mm3 Final    Eosinophils, Absolute 04/15/2024 0.22  0.00 - 0.40 10*3/mm3 Final    Basophils, Absolute 04/15/2024 0.03  0.00 - 0.20 10*3/mm3 Final    Immature Grans, Absolute 04/15/2024 0.01  0.00 - 0.05 10*3/mm3 Final    nRBC 04/15/2024 0.0  0.0 - 0.2 /100 WBC Final       ASSESSMENT:   Simpson is a pleasant  43-year-old male with medical history significant for arthritis comes for left renal lesion evaluation and management.      #Suspected left renal RCC:   Patient is currently on active surveillance for right RCC which was removed in December 2022.    The surveillance CT A/P performed for 6/29/2023 (performed at [facility]) noted a 0.7 cm solid enhancing mass at the anterior cortex of the interpolar portion of the left kidney, minimally increased from 5 mm previously in October 2022.  There are other simple left renal cysts.  No evidence of disease recurrence on the right nephrectomy bed.   I reviewed the radiologic findings and various management options which nclude observation, RF/cryoablation versus systemic therapy.  Dr. Elias too has discussed surgical as well as conservative management options with him.  Given its small size, the benefit of oral TKI (?  Sunitinib) or immunotherapy (pembrolizumab) would be uncertain.  Discussed plan to refer to IR to discuss risk/benefit of radiofrequency ablation and cryoablation therapies. Dr. Elias thinks it will be a difficult partial nephrectomy due to size and difficult to find location.  He also thinks patient is not a candidate for cryotherapy or RF due to location and size.  Case discussed with Dr. Adames, IR. On his and uroradiologist's review, the left renal lesion appears to be a proteinaceous cyst.  Spoke with patient overphone.  Plan made for active surveillance at this time.  Patient is currently scheduled for repeat CT scan in October 2023 with Dr. Elias's office.    10/25/2023: CT C/A/P with and without contrast shows Stable subcentimeter (7 mm) hyperdense left anterior interpolar renal mass with questionable enhancement.  1/25/2024: CT C/A/P shows stable left renal lesion,~8 mm.  Likely hemorrhagic/proteinaceous cyst.  4/15/2024: Patient remains asymptomatic.  Reviewed the recent CT scan performed at [facility] which shows stable left renal  lesion.  Discussed plan to continue to monitor.  Says Dr. Elias has planned to repeat scans every 3-6 months.  ADV used to maintain close follow-up with Dr. Elias.  I will plan to see him back in 1 year time or sooner if needed.    #Right clear-cell renal cell carcinoma:  The CT A/P noted a complex solid and cystic 4.7 x 4.2 cm mass in the inter/lower pole of the right kidney.  Indeterminate 5 mm lesion interpolar left kidney.   MRI abdomen 2 noted right kidney lesion concerning for malignancy.  A tiny benign hemorrhagic/proteinaceous capsular cyst anterior left mid kidney and 3.1 cm benign left mid renal simple cyst noted.  Other benign cystic lesions in the liver noted as well.   He was seen by Dr. Elias, urology who performed right radical nephrectomy on 12/9/2022.   Pathology: Clear-cell renal cell carcinoma.  3.5 x 3.5 x 3.1 cm.  pT1.  Grade 3.  All margins negative.  No rhabdoid/sarcomatoid features.  No adjuvant therapy was administered.  Patient was under active surveillance.  Repeat CT scans from October 2023 and January 2024 showed no evidence of disease recurrence.    #Encounter for genetic counseling:  Given his young age of diagnosis and family history significant for prostate cancer, [facility] germline testing was performed which came back negative.  7/31/2023: Patient's father was diagnosed with prostate cancer in his 80s.  Given his young age, advised PSA screening annually.    10/30/2023: Most recent PSA from 10/27/2023 normal at 0.8    #Left sided pneumonia (10/3/2023): On amoxicillin and azithromycin as per PCP    #CKD-3:   Serum creatinine elevated at 1.68, EGFR 50.8 on 10/30/2023.  Encouraged to maintain adequate hydration and avoid nephrotoxic agents.    PLAN:   -Continue active surveillance  -Surveillance CT scans as per Dr. Elias, first urology  - MD follow-up in 1 year after scans or sooner if needed    Orders Placed This Encounter   Procedures    Comprehensive Metabolic Panel      Standing Status:   Future     Standing Expiration Date:   4/15/2025     Order Specific Question:   Release to patient     Answer:   Routine Release [[id number]]    CBC & Differential     Standing Status:   Future     Standing Expiration Date:   4/15/2025     Order Specific Question:   Manual Differential     Answer:   No     Order Specific Question:   Release to patient     Answer:   Routine Release [[id number]]   Total time spent during this patient encounter is 45 minutes. The total time spent with the patient includes: preparing to see the patient by reviewing of tests, prior notes or other relevant information, performing appropriate independent examination & evaluation, counseling, ordering of medications, tests or procedures, documenting clinic information in the electronic medical records or other health records, independently interpreting results of tests and communicating the results to the patient/family or caregiver.

## 2024-07-26 DIAGNOSIS — K21.00 GASTROESOPHAGEAL REFLUX DISEASE WITH ESOPHAGITIS WITHOUT HEMORRHAGE: ICD-10-CM

## 2024-07-26 RX ORDER — PANTOPRAZOLE SODIUM 40 MG/1
40 TABLET, DELAYED RELEASE ORAL DAILY
Qty: 90 TABLET | Refills: 0 | Status: SHIPPED | OUTPATIENT
Start: 2024-07-26

## 2024-07-26 NOTE — TELEPHONE ENCOUNTER
"Caller: Chi Simpson \"Jose\"    Relationship: Self    Best call back number:     [phone] (       Requested Prescriptions:   Requested Prescriptions     Pending Prescriptions Disp Refills    pantoprazole (PROTONIX) 40 MG EC tablet 90 tablet 3     Sig: Take 1 tablet by mouth Daily. Taper off of medication slowly once improving.        Pharmacy where request should be sent: [id number] [address] [facility] [phone] [facility] [phone]      Last office visit with prescribing clinician: 3/5/2024   Last telemedicine visit with prescribing clinician: Visit date not found   Next office visit with prescribing clinician: Visit date not found     Additional details provided by patient:PATIENT CALLING STATING THAT HE IS OUT OF MEDICATION AND CURRENTLY OUT OF TOWN    Does the patient have less than a 3 day supply:  [x] Yes  [] No    Would you like a call back once the refill request has been completed: [] Yes [x] No    If the office needs to give you a call back, can they leave a voicemail: [] Yes [x] No    Vincent Osborne Rep   07/26/24 07:36 EDT         "

## 2024-12-13 ENCOUNTER — OFFICE VISIT (OUTPATIENT)
Dept: INTERNAL MEDICINE | Facility: CLINIC | Age: 44
End: 2024-12-13
Payer: COMMERCIAL

## 2024-12-13 VITALS
OXYGEN SATURATION: 97 % | TEMPERATURE: 97.7 F | DIASTOLIC BLOOD PRESSURE: 78 MMHG | SYSTOLIC BLOOD PRESSURE: 128 MMHG | BODY MASS INDEX: 31.71 KG/M2 | HEIGHT: 75 IN | HEART RATE: 79 BPM | WEIGHT: 255 LBS

## 2024-12-13 DIAGNOSIS — H65.01 RIGHT ACUTE SEROUS OTITIS MEDIA, RECURRENCE NOT SPECIFIED: ICD-10-CM

## 2024-12-13 DIAGNOSIS — R05.1 ACUTE COUGH: Primary | ICD-10-CM

## 2024-12-13 DIAGNOSIS — J34.89 RHINORRHEA: ICD-10-CM

## 2024-12-13 PROCEDURE — 87428 SARSCOV & INF VIR A&B AG IA: CPT | Performed by: NURSE PRACTITIONER

## 2024-12-13 PROCEDURE — 99213 OFFICE O/P EST LOW 20 MIN: CPT | Performed by: NURSE PRACTITIONER

## 2024-12-13 NOTE — PROGRESS NOTES
"Chief Complaint  Cough (X2 days), Nasal Congestion, and Headache    Subjective        Chi Simpson presents to [facility]  History of Present Illness  He started with a runny nose, sore throat, not sleeping well.     No fever, no muscle pain, but now has a lot of congestion in his chest and ear pain or pressure. He has not been taking any OTC medication aside from cough drops.   No wheezing, no SOA, but feeling fatigued. He states he is prone to ear infections, getting about 1 per year.       Objective   Vital Signs:  /78   Pulse 79   Temp 97.7 °F (36.5 °C)   Ht 190.5 cm (75\")   Wt 116 kg (255 lb)   SpO2 97%   BMI 31.87 kg/m²   Estimated body mass index is 31.87 kg/m² as calculated from the following:    Height as of this encounter: 190.5 cm (75\").    Weight as of this encounter: 116 kg (255 lb).       BMI is >= 30 and <35. (Class 1 Obesity). The following options were offered after discussion;: not discussed at today's office visit.       Physical Exam  Vitals and nursing note reviewed.   Constitutional:       General: He is not in acute distress.     Appearance: Normal appearance. He is not ill-appearing, toxic-appearing or diaphoretic.   HENT:      Right Ear: Hearing normal. A middle ear effusion (white/opaque) is present. No mastoid tenderness.      Left Ear: Hearing normal. A middle ear effusion is present. No mastoid tenderness. Tympanic membrane is scarred.      Mouth/Throat:      Mouth: Mucous membranes are moist.      Pharynx: Oropharynx is clear.   Cardiovascular:      Rate and Rhythm: Normal rate and regular rhythm.      Heart sounds: Normal heart sounds.   Pulmonary:      Effort: Pulmonary effort is normal.      Breath sounds: Normal breath sounds.   Skin:     General: Skin is warm and dry.   Neurological:      General: No focal deficit present.      Mental Status: He is alert and oriented to person, place, and time. Mental status is at baseline.        Result " Review :                   Assessment and Plan   Patient is a 44-year-old male with obesity, hyperlipidemia, history of renal cell carcinoma status post right nephrectomy, GERD here with 2-week history of upper respiratory infection, negative for COVID19 and influenza, found to have mild otitis media of the right ear.     Results for orders placed or performed in visit on 12/13/24   POCT SARS-CoV-2 + Flu Antigen ANOOP    Collection Time: 12/13/24  3:15 PM    Specimen: Swab   Result Value Ref Range    SARS Antigen Not Detected Not Detected, Presumptive Negative    Influenza A Antigen ANOOP Not Detected Not Detected    Influenza B Antigen ANOOP Not Detected Not Detected    Internal Control Passed Passed    Lot Number [id number]     Expiration Date 10/23/25          Diagnoses and all orders for this visit:    1. Acute cough (Primary)  -     POCT SARS-CoV-2 + Flu Antigen AONOP    2. Rhinorrhea    3. Right acute serous otitis media, recurrence not specified  -     amoxicillin-clavulanate (AUGMENTIN) 875-125 MG per tablet; Take 1 tablet by mouth 2 (Two) Times a Day for 7 days.  Dispense: 14 tablet; Refill: 0    Take OTC medications to help with symptom management.          Follow Up   Return for Follow up with Dr. Knowles as needed..  Patient was given instructions and counseling regarding his condition or for health maintenance advice. Please see specific information pulled into the AVS if appropriate.          Negative

## 2025-05-14 ENCOUNTER — LAB (OUTPATIENT)
Dept: LAB | Facility: HOSPITAL | Age: 45
End: 2025-05-14
Payer: COMMERCIAL

## 2025-05-14 ENCOUNTER — OFFICE VISIT (OUTPATIENT)
Dept: ONCOLOGY | Facility: CLINIC | Age: 45
End: 2025-05-14
Payer: COMMERCIAL

## 2025-05-14 VITALS
TEMPERATURE: 98.4 F | OXYGEN SATURATION: 96 % | DIASTOLIC BLOOD PRESSURE: 77 MMHG | HEIGHT: 75 IN | SYSTOLIC BLOOD PRESSURE: 119 MMHG | HEART RATE: 79 BPM | WEIGHT: 252.2 LBS | BODY MASS INDEX: 31.36 KG/M2 | RESPIRATION RATE: 17 BRPM

## 2025-05-14 DIAGNOSIS — C64.1 CANCER OF KIDNEY PARENCHYMA, RIGHT: ICD-10-CM

## 2025-05-14 LAB
ALBUMIN SERPL-MCNC: 4.5 G/DL (ref 3.5–5.2)
ALBUMIN/GLOB SERPL: 1.9 G/DL
ALP SERPL-CCNC: 67 U/L (ref 39–117)
ALT SERPL W P-5'-P-CCNC: 22 U/L (ref 1–41)
ANION GAP SERPL CALCULATED.3IONS-SCNC: 11.1 MMOL/L (ref 5–15)
AST SERPL-CCNC: 16 U/L (ref 1–40)
BASOPHILS # BLD AUTO: 0.03 10*3/MM3 (ref 0–0.2)
BASOPHILS NFR BLD AUTO: 0.5 % (ref 0–1.5)
BILIRUB SERPL-MCNC: 0.4 MG/DL (ref 0–1.2)
BUN SERPL-MCNC: 23 MG/DL (ref 6–20)
BUN/CREAT SERPL: 12.7 (ref 7–25)
CALCIUM SPEC-SCNC: 9.6 MG/DL (ref 8.6–10.5)
CHLORIDE SERPL-SCNC: 107 MMOL/L (ref 98–107)
CO2 SERPL-SCNC: 23.9 MMOL/L (ref 22–29)
CREAT SERPL-MCNC: 1.81 MG/DL (ref 0.76–1.27)
DEPRECATED RDW RBC AUTO: 39.5 FL (ref 37–54)
EGFRCR SERPLBLD CKD-EPI 2021: 46.1 ML/MIN/1.73
EOSINOPHIL # BLD AUTO: 0.15 10*3/MM3 (ref 0–0.4)
EOSINOPHIL NFR BLD AUTO: 2.7 % (ref 0.3–6.2)
ERYTHROCYTE [DISTWIDTH] IN BLOOD BY AUTOMATED COUNT: 12.4 % (ref 12.3–15.4)
GLOBULIN UR ELPH-MCNC: 2.4 GM/DL
GLUCOSE SERPL-MCNC: 83 MG/DL (ref 65–99)
HCT VFR BLD AUTO: 44.6 % (ref 37.5–51)
HGB BLD-MCNC: 14.6 G/DL (ref 13–17.7)
IMM GRANULOCYTES # BLD AUTO: 0.01 10*3/MM3 (ref 0–0.05)
IMM GRANULOCYTES NFR BLD AUTO: 0.2 % (ref 0–0.5)
LYMPHOCYTES # BLD AUTO: 1.28 10*3/MM3 (ref 0.7–3.1)
LYMPHOCYTES NFR BLD AUTO: 22.9 % (ref 19.6–45.3)
MCH RBC QN AUTO: 28.5 PG (ref 26.6–33)
MCHC RBC AUTO-ENTMCNC: 32.7 G/DL (ref 31.5–35.7)
MCV RBC AUTO: 86.9 FL (ref 79–97)
MONOCYTES # BLD AUTO: 0.41 10*3/MM3 (ref 0.1–0.9)
MONOCYTES NFR BLD AUTO: 7.3 % (ref 5–12)
NEUTROPHILS NFR BLD AUTO: 3.7 10*3/MM3 (ref 1.7–7)
NEUTROPHILS NFR BLD AUTO: 66.4 % (ref 42.7–76)
NRBC BLD AUTO-RTO: 0 /100 WBC (ref 0–0.2)
PLATELET # BLD AUTO: 260 10*3/MM3 (ref 140–450)
PMV BLD AUTO: 10.2 FL (ref 6–12)
POTASSIUM SERPL-SCNC: 3.9 MMOL/L (ref 3.5–5.2)
PROT SERPL-MCNC: 6.9 G/DL (ref 6–8.5)
RBC # BLD AUTO: 5.13 10*6/MM3 (ref 4.14–5.8)
SODIUM SERPL-SCNC: 142 MMOL/L (ref 136–145)
WBC NRBC COR # BLD AUTO: 5.58 10*3/MM3 (ref 3.4–10.8)

## 2025-05-14 PROCEDURE — 80053 COMPREHEN METABOLIC PANEL: CPT

## 2025-05-14 PROCEDURE — 36415 COLL VENOUS BLD VENIPUNCTURE: CPT

## 2025-05-14 PROCEDURE — 85025 COMPLETE CBC W/AUTO DIFF WBC: CPT

## 2025-05-14 NOTE — PROGRESS NOTES
[facility]    CONSULTING IN BLOOD DISORDERS & CANCER      REASON FOR CONSULTATION/CHIEF COMPLAINT:     Evaluation and management for bilateral renal cell carcinoma.                             REQUESTING PHYSICIAN: No ref. provider found  RECORDS OBTAINED:  Records of the patients history including those from the electronic medical record were reviewed and summarized in detail.    HISTORY OF PRESENT ILLNESS:    The patient is a 44 y.o. year old male with medical history significant for arthritis was incidentally noted to have a right renal mass on a CT scan in October 2022.  The CT A/P noted a complex solid and cystic 4.7 x 4.2 cm mass in the inter/lower pole of the right kidney.  Indeterminate 5 mm lesion interpolar left kidney.  MRI abdomen 2 noted right kidney lesion concerning for malignancy.  A tiny benign hemorrhagic/proteinaceous capsular cyst anterior left mid kidney and 3.1 cm benign left mid renal simple cyst noted.  Other benign cystic lesions in the liver noted as well.  It was seen by Dr. Elias, urology who performed right radical nephrectomy on 12/9/2022.     Pathology: Clear-cell renal cell carcinoma.  3.5 x 3.5 x 3.1 cm.  pT1.  Grade 3.  All margins negative.  No rhabdoid/sarcomatoid features.  No adjuvant therapy was administered.  Patient was under active surveillance.    A surveillance CT A/P performed for 6/29/2023 noted evidence of right nephrectomy with no sign of local tumor recurrence.  On the left kidney, there is a 0.7 cm solid enhancing mass at the anterior cortex of the interpolar portion of the kidney, minimally increased from 5 mm previously in October 2022.  There are other simple left renal cysts.    Dr. Elias had a discussed subsequent management options with observation, RF/cryoablation versus systemic therapy.    Patient has been referred to this clinic to discuss systemic treatment options for his small left renal lesion.  Patient currently denies any symptoms.  Denies  any urinary symptoms or abdominal pain.  No history of tobacco/alcohol/drug abuse.  Family history significant for father with history of prostatic cancer.  Patient is currently employed with the [employer], [employer] service.     10/25/2023: CT C/A/P with and without contrast shows Stable subcentimeter (7 mm) hyperdense left anterior interpolar renal mass with questionable   enhancement. Recommend continued attention on follow-up imaging    January 2024: CT C/A/P shows stable left renal lesion ~8 mm, likely benign proteinaceous cyst.  No other findings of disease recurrence    INTERIM HISTORY:  Patient returns to the clinic for a follow-up visit.  No major health issues since last visit.  Trying to maintain adequate hydration.  No new medications.    Past Medical History:   Diagnosis Date    Allergic     Pet dander; ceclore    Ankle sprain     Cancer     Kidney cancer on the right kidney; removal of two basal cell carcinomas    Dislocation of finger     Frozen shoulder July 2022    Had arthroscopic surgery on LT shoulder and it is still stiff    GERD (gastroesophageal reflux disease)     Knee swelling     Low back strain     Rotator cuff syndrome     Tear of meniscus of knee     Torn meniscus      Past Surgical History:   Procedure Laterality Date    KNEE ARTHROSCOPY Right 02/2011    KNEE ARTHROSCOPY Left 01/1997    NEPHRECTOMY Right     12/2022    SHOULDER SURGERY         MEDICATIONS    Current Outpatient Medications:     acetaminophen (TYLENOL) 325 MG tablet, Take 1,000 mg by mouth., Disp: , Rfl:     pantoprazole (PROTONIX) 40 MG EC tablet, Take 1 tablet by mouth Daily. Taper off of medication slowly once improving., Disp: 90 tablet, Rfl: 0    ALLERGIES:     Allergies   Allergen Reactions    Ceclor [Cefaclor] Hives       SOCIAL HISTORY:       Social History     Socioeconomic History    Marital status:    Tobacco Use    Smoking status: Never    Smokeless tobacco: Never   Vaping Use    Vaping status:  "Never Used   Substance and Sexual Activity    Alcohol use: Yes     Alcohol/week: 1.0 - 2.0 standard drink of alcohol     Types: 1 - 2 Cans of beer per week    Drug use: Not Currently     Types: Marijuana    Sexual activity: Yes     Partners: Female         FAMILY HISTORY:  Family History   Problem Relation Age of Onset    Leukemia Mother     Heart disease Father     Hypertension Father     Prostate cancer Father     Rheum arthritis Father     Depression Father     Alcohol abuse Father     Cancer Father         Prostate cancer; bone cancer       REVIEW OF SYSTEMS:  As per HPI         Vitals:    05/14/25 1331   BP: 119/77   Pulse: 79   Resp: 17   Temp: 98.4 °F (36.9 °C)   TempSrc: Oral   SpO2: 96%   Weight: 114 kg (252 lb 3.2 oz)   Height: 190 cm (74.8\")   PainSc: 0-No pain             5/14/2025     1:32 PM   Current Status   ECOG score 0      PHYSICAL EXAM:    CONSTITUTIONAL:  Vital signs reviewed.  No distress, looks comfortable.  EYES:  Conjunctiva and lids unremarkable.    EARS,NOSE,MOUTH,THROAT:  Ears and nose appear unremarkable.    RESPIRATORY:  Normal respiratory effort.  Lungs clear to auscultation bilaterally.  CARDIOVASCULAR:  Normal S1, S2.  No murmurs rubs or gallops.  No significant lower extremity edema.  GASTROINTESTINAL: Abdomen appears unremarkable.  Nondistended   LYMPHATIC:  No cervical, supraclavicular lymphadenopathy.  SKIN:  Warm.  No rashes.  PSYCHIATRIC:  Normal judgment and insight.  Normal mood and affect.  NEURO: AAOx3, no obvious focal deficits.    RECENT LABS:        Lab on 05/14/2025   Component Date Value Ref Range Status    WBC 05/14/2025 5.58  3.40 - 10.80 10*3/mm3 Final    RBC 05/14/2025 5.13  4.14 - 5.80 10*6/mm3 Final    Hemoglobin 05/14/2025 14.6  13.0 - 17.7 g/dL Final    Hematocrit 05/14/2025 44.6  37.5 - 51.0 % Final    MCV 05/14/2025 86.9  79.0 - 97.0 fL Final    MCH 05/14/2025 28.5  26.6 - 33.0 pg Final    MCHC 05/14/2025 32.7  31.5 - 35.7 g/dL Final    RDW 05/14/2025 12.4  " 12.3 - 15.4 % Final    RDW-SD 05/14/2025 39.5  37.0 - 54.0 fl Final    MPV 05/14/2025 10.2  6.0 - 12.0 fL Final    Platelets 05/14/2025 260  140 - 450 10*3/mm3 Final    Neutrophil % 05/14/2025 66.4  42.7 - 76.0 % Final    Lymphocyte % 05/14/2025 22.9  19.6 - 45.3 % Final    Monocyte % 05/14/2025 7.3  5.0 - 12.0 % Final    Eosinophil % 05/14/2025 2.7  0.3 - 6.2 % Final    Basophil % 05/14/2025 0.5  0.0 - 1.5 % Final    Immature Grans % 05/14/2025 0.2  0.0 - 0.5 % Final    Neutrophils, Absolute 05/14/2025 3.70  1.70 - 7.00 10*3/mm3 Final    Lymphocytes, Absolute 05/14/2025 1.28  0.70 - 3.10 10*3/mm3 Final    Monocytes, Absolute 05/14/2025 0.41  0.10 - 0.90 10*3/mm3 Final    Eosinophils, Absolute 05/14/2025 0.15  0.00 - 0.40 10*3/mm3 Final    Basophils, Absolute 05/14/2025 0.03  0.00 - 0.20 10*3/mm3 Final    Immature Grans, Absolute 05/14/2025 0.01  0.00 - 0.05 10*3/mm3 Final    nRBC 05/14/2025 0.0  0.0 - 0.2 /100 WBC Final       ASSESSMENT:  Mr. Simpson is a pleasant 43-year-old male with medical history significant for arthritis comes for left renal lesion evaluation and management.      #Suspected left renal RCC:   Patient is currently on active surveillance for right RCC which was removed in December 2022.    The surveillance CT A/P performed for 6/29/2023 (performed at [facility] urology) noted a 0.7 cm solid enhancing mass at the anterior cortex of the interpolar portion of the left kidney, minimally increased from 5 mm previously in October 2022.  There are other simple left renal cysts.  No evidence of disease recurrence on the right nephrectomy bed.   I reviewed the radiologic findings and various management options which nclude observation, RF/cryoablation versus systemic therapy.  Dr. Elias too has discussed surgical as well as conservative management options with him.  Given its small size, the benefit of oral TKI (?  Sunitinib) or immunotherapy (pembrolizumab) would be uncertain.  Discussed plan to refer to  IR to discuss risk/benefit of radiofrequency ablation and cryoablation therapies. Dr. Elias thinks it will be a difficult partial nephrectomy due to size and difficult to find location.  He also thinks patient is not a candidate for cryotherapy or RF due to location and size.  Case discussed with STEVEN Hernández. On his and uroradiologist's review, the left renal lesion appears to be a proteinaceous cyst.  Spoke with patient overphone.  Plan made for active surveillance at this time.  Patient is currently scheduled for repeat CT scan in October 2023 with Dr. Elias's office.    10/25/2023: CT C/A/P with and without contrast shows Stable subcentimeter (7 mm) hyperdense left anterior interpolar renal mass with questionable enhancement.  1/25/2024 and November 2024: CT C/A/P shows stable left renal lesion,~8 mm.  Likely hemorrhagic/proteinaceous cyst.  5/14/2025: Patient remains asymptomatic.  Reviewed the recent CT scan performed at [facility]rology in November 2024 which shows stable left renal lesion.  Discussed plan to continue to monitor.  Says Dr. Elias has planned to repeat scans every 6 months-next due in June 2025.  A to maintain close follow-up with Dr. Elias.  I will plan to see him back in 1 year time or sooner if needed.    #Right clear-cell renal cell carcinoma:  The CT A/P noted a complex solid and cystic 4.7 x 4.2 cm mass in the inter/lower pole of the right kidney.  Indeterminate 5 mm lesion interpolar left kidney.   MRI abdomen 2 noted right kidney lesion concerning for malignancy.  A tiny benign hemorrhagic/proteinaceous capsular cyst anterior left mid kidney and 3.1 cm benign left mid renal simple cyst noted.  Other benign cystic lesions in the liver noted as well.   He was seen by Dr. Elias, urology who performed right radical nephrectomy on 12/9/2022.   Pathology: Clear-cell renal cell carcinoma.  3.5 x 3.5 x 3.1 cm.  pT1.  Grade 3.  All margins negative.  No rhabdoid/sarcomatoid  features.  No adjuvant therapy was administered.  Patient was under active surveillance.  Repeat CT scans from October 2023 and January 2024 showed no evidence of disease recurrence.    #Encounter for genetic counseling:  Given his young age of diagnosis and family history significant for prostate cancer, [employer] germline testing was performed which came back negative.  7/31/2023: Patient's father was diagnosed with prostate cancer in his 80s.  Given his young age, advised PSA screening annually.    10/30/2023: Most recent PSA from 10/27/2023 normal at 0.8    #Left sided pneumonia (10/3/2023): On amoxicillin and azithromycin as per PCP    #CKD-3:   Serum creatinine elevated at 1.68, EGFR 50.8 on 10/30/2023.  May 2025: Serum creatinine elevated at 1.81.  Patient has an appointment with nephrology on 6/20/2025.  Encouraged to maintain adequate hydration and avoid nephrotoxic agents.    PLAN:   -Continue active surveillance  -Surveillance CT scans as per Dr. Elias, [facility].  Next due in June 2025  -Follow-up with nephrology as scheduled  - MD follow-up in 1 year after scans or sooner if needed    Orders Placed This Encounter   Procedures    Comprehensive Metabolic Panel     Standing Status:   Future     Number of Occurrences:   1     Expected Date:   5/14/2025     Expiration Date:   5/9/2026     Release to patient:   Routine Release [[id number]]    CBC & Differential     Standing Status:   Future     Number of Occurrences:   1     Expected Date:   5/14/2025     Expiration Date:   5/9/2026     Manual Differential:   No     Release to patient:   Routine Release [[id number]]   Total time spent during this patient encounter is 41 minutes. The total time spent with the patient includes: preparing to see the patient by reviewing of tests, prior notes or other relevant information, performing appropriate independent examination & evaluation, counseling, ordering of medications, tests or procedures, documenting  clinic information in the electronic medical records or other health records, independently interpreting results of tests and communicating the results to the patient/family or caregiver.